# Patient Record
Sex: MALE | Race: WHITE | NOT HISPANIC OR LATINO | Employment: OTHER | URBAN - METROPOLITAN AREA
[De-identification: names, ages, dates, MRNs, and addresses within clinical notes are randomized per-mention and may not be internally consistent; named-entity substitution may affect disease eponyms.]

---

## 2017-11-21 ENCOUNTER — INITIAL CONSULT (OUTPATIENT)
Dept: VASCULAR SURGERY | Facility: CLINIC | Age: 57
End: 2017-11-21
Payer: MEDICARE

## 2017-11-21 VITALS
TEMPERATURE: 98 F | SYSTOLIC BLOOD PRESSURE: 123 MMHG | WEIGHT: 234 LBS | BODY MASS INDEX: 30.03 KG/M2 | DIASTOLIC BLOOD PRESSURE: 77 MMHG | HEART RATE: 88 BPM | HEIGHT: 74 IN

## 2017-11-21 DIAGNOSIS — F17.210 CIGARETTE NICOTINE DEPENDENCE WITHOUT COMPLICATION: ICD-10-CM

## 2017-11-21 DIAGNOSIS — I25.10 CORONARY ARTERY DISEASE INVOLVING NATIVE CORONARY ARTERY OF NATIVE HEART WITHOUT ANGINA PECTORIS: ICD-10-CM

## 2017-11-21 DIAGNOSIS — J44.9 CHRONIC OBSTRUCTIVE PULMONARY DISEASE, UNSPECIFIED COPD TYPE: ICD-10-CM

## 2017-11-21 DIAGNOSIS — I71.40 ABDOMINAL AORTIC ANEURYSM (AAA) WITHOUT RUPTURE: ICD-10-CM

## 2017-11-21 PROCEDURE — 99999 PR PBB SHADOW E&M-EST. PATIENT-LVL III: CPT | Mod: PBBFAC,,, | Performed by: SURGERY

## 2017-11-21 PROCEDURE — 99205 OFFICE O/P NEW HI 60 MIN: CPT | Mod: S$PBB,,, | Performed by: SURGERY

## 2017-11-21 PROCEDURE — 99213 OFFICE O/P EST LOW 20 MIN: CPT | Mod: PBBFAC | Performed by: SURGERY

## 2017-11-21 RX ORDER — ATORVASTATIN CALCIUM 40 MG/1
40 TABLET, FILM COATED ORAL DAILY
Qty: 90 TABLET | Refills: 3 | Status: SHIPPED | OUTPATIENT
Start: 2017-11-21 | End: 2018-11-21

## 2017-11-21 RX ORDER — NAPROXEN SODIUM 220 MG/1
81 TABLET, FILM COATED ORAL DAILY
Refills: 0 | COMMUNITY
Start: 2017-11-21 | End: 2018-11-21

## 2017-11-21 NOTE — LETTER
November 21, 2017      Arash Dasilva MD  [address]  [place] [address]           [facility]  [address]  [place] [address]  Phone: [phone]  Fax: [phone]          Patient: Georgi Jo   MR Number: [id number]   YOB: 1960   Date of Visit: 11/21/2017       Dear Dr. Arash Dasilva:    Thank you for referring Georgi Jo to me for evaluation. Attached you will find relevant portions of my assessment and plan of care.    If you have questions, please do not hesitate to call me. I look forward to following Georgi Jo along with you.    Sincerely,    ADAM Mendez III, MD    Enclosure  CC:  No Recipients    If you would like to receive this communication electronically, please contact [email or web address] or [phone] to request more information on [employer] access.    For providers and/or their staff who would like to refer a patient to [facility], please contact us through our one-stop-shop provider referral line, [facility], at [phone].    If you feel you have received this communication in error or would no longer like to receive these types of communications, please e-mail [email or web address]

## 2017-11-21 NOTE — PROGRESS NOTES
REFERRING PHYSICIAN:  Arash Yanes M.D.    HISTORY OF PRESENT ILLNESS:  A 57-year-old male with a recently discovered 5.5   cm juxtarenal abdominal aortic aneurysm, who was sent for potential evaluation   of a fenestrated endograft.  He has no acute back or abdominal pain.    PAST MEDICAL HISTORY:  1.  Known coronary artery disease, status post PCI with three stents in 2014.    Denies chest or abdominal pain.  2.  Active smoking.  3.  Hyperlipidemia.  4.  Prediabetes.    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    FAMILY HISTORY:  Positive for coronary artery disease.    SOCIAL HISTORY:  Current smoking, half pack per day.    MEDICATIONS:  Include aspirin and Plavix, although these were recently stopped.    He was also on statin.    ALLERGIES:  TO CODEINE, WHICH CAUSES ITCHING.    REVIEW OF SYSTEMS:  Admits to one block claudication.  Chronic blurred vision as   above.  All other systems include eyes, ENT, , respiratory, musculoskeletal,   breast, psychiatric, lymph, allergy, and immune are negative.    PHYSICAL EXAMINATION:  VITAL SIGNS:  See nursing notes.  GENERAL:  In no acute distress.  RESPIRATORY:  Normal effort.  Clear to auscultation.  CARDIAC:  Regular rate and rhythm.  PMI nondisplaced.  No murmur.  VASCULAR:  2+ radial and brachial pulses, femoral pulses are 1+, which may be   due to body habitus.  Pedal pulses are 2+ bilaterally.  EXTREMITIES:  Have some modest edema.  No varicosities, ulcerations, gangrene,   or digital petechiae.  Without clubbing or cyanosis.  ABDOMEN:  No masses or tenderness.  No hepatosplenomegaly.  His aortic aneurysm   is not readily palpable, which may be due to his body habitus.  EYES:  Normal conjunctivae and lids.  ENT:  Poor dentition.  NECK:  No JVD.  No thyromegaly.  MUSCULOSKELETAL:  No kyphosis or scoliosis.  SKIN:  Warm and dry.  NEUROLOGIC:  Alert and oriented x3.  Normal mood and affect.  Midline tongue.    No speech  difficulty or hoarseness, with 5/5 motor strength in all extremities.    IMAGING:  Outside CT scan was personally reviewed.  It demonstrates,  1.  Juxtarenal 5.5 cm AAA.  2.  Right renal artery much higher than left.  3.  Less than 5 mm between the right renal artery and the SMA.  4.  Small right common iliac artery aneurysm.  5.  Significant size LORENZO but SMA is widely patent without stenosis.    ASSESSMENT:  A 5.5 cm juxtarenal abdominal aortic aneurysm.    Unfortunately, he is not an anatomic candidate for fenestrated device because of   the close proximity of the renal artery to his SMA.  For the remaining   purposes, there is not enough room to put both a fenestration and a scallop.    RECOMMENDATIONS:  1.  I feel that he would be best served with an open operation, which would   require clamping above his left renal artery but likely below the right renal   artery.  2.  I agree with Dr. Lee's note that he needs a cardiac stress test prior to   consideration of an open repair.  I asked Dr. Lee's office to arrange this   for him to be done locally in [place].  3.  I told the patient and family that Dr. Lee might very well like to   perform this open operation locally, but they stated that was not the case, and   that he said he would need to come to [place] for either a fenestrated   repair or an open repair.  I will confirm with Dr. Lee that indeed he does   not wish to perform the open procedure.  If not, after the patient's stress test   returns like a copy of that and if this looks okay, then we can schedule him   for an open juxtarenal AAA repair.      [place]  dd: 11/21/2017 17:03:56 (CST)  td: 11/21/2017 17:50:18 (CST)  Doc ID   #[id number]  Job ID #[id number]    CC: VAL LEE M.D.

## 2017-12-12 ENCOUNTER — TELEPHONE (OUTPATIENT)
Dept: VASCULAR SURGERY | Facility: CLINIC | Age: 57
End: 2017-12-12

## 2017-12-12 NOTE — TELEPHONE ENCOUNTER
----- Message from Luca Cunningham sent at 12/12/2017 10:28 AM CST -----  Contact: Edna//Daughter  Caller states that (s)he needs to speak with nurse in ref to scheduling sx for the pt//please call back at [phone]//thank you

## 2017-12-19 ENCOUNTER — OFFICE VISIT (OUTPATIENT)
Dept: VASCULAR SURGERY | Facility: CLINIC | Age: 57
End: 2017-12-19
Payer: MEDICARE

## 2017-12-19 ENCOUNTER — HOSPITAL ENCOUNTER (OUTPATIENT)
Dept: RADIOLOGY | Facility: HOSPITAL | Age: 57
Discharge: HOME | End: 2017-12-19
Attending: SURGERY
Payer: MEDICARE

## 2017-12-19 VITALS
HEART RATE: 83 BPM | DIASTOLIC BLOOD PRESSURE: 78 MMHG | TEMPERATURE: 98 F | HEIGHT: 72 IN | SYSTOLIC BLOOD PRESSURE: 126 MMHG | WEIGHT: 241 LBS | BODY MASS INDEX: 32.64 KG/M2

## 2017-12-19 DIAGNOSIS — Z01.818 PRE-OP EVALUATION: ICD-10-CM

## 2017-12-19 DIAGNOSIS — I71.40 ABDOMINAL AORTIC ANEURYSM (AAA) WITHOUT RUPTURE: Primary | ICD-10-CM

## 2017-12-19 DIAGNOSIS — Z01.818 PRE-OP EVALUATION: Primary | ICD-10-CM

## 2017-12-19 DIAGNOSIS — I71.40 AAA (ABDOMINAL AORTIC ANEURYSM): ICD-10-CM

## 2017-12-19 DIAGNOSIS — I51.9 SYSTOLIC DYSFUNCTION: ICD-10-CM

## 2017-12-19 PROCEDURE — 99999 PR PBB SHADOW E&M-EST. PATIENT-LVL III: CPT | Mod: PBBFAC,,, | Performed by: SURGERY

## 2017-12-19 PROCEDURE — 71020 XR CHEST PA AND LATERAL: CPT | Mod: 26,,, | Performed by: RADIOLOGY

## 2017-12-19 PROCEDURE — 99213 OFFICE O/P EST LOW 20 MIN: CPT | Mod: PBBFAC | Performed by: SURGERY

## 2017-12-19 PROCEDURE — 71020 XR CHEST PA AND LATERAL: CPT | Mod: TC

## 2017-12-19 PROCEDURE — 99215 OFFICE O/P EST HI 40 MIN: CPT | Mod: S$PBB,,, | Performed by: SURGERY

## 2017-12-19 RX ORDER — LIDOCAINE HYDROCHLORIDE 10 MG/ML
1 INJECTION, SOLUTION EPIDURAL; INFILTRATION; INTRACAUDAL; PERINEURAL ONCE
Status: CANCELLED | OUTPATIENT
Start: 2017-12-19 | End: 2017-12-19

## 2017-12-19 RX ORDER — HYDROCODONE BITARTRATE AND ACETAMINOPHEN 500; 5 MG/1; MG/1
TABLET ORAL
Status: CANCELLED | OUTPATIENT
Start: 2017-12-19

## 2017-12-19 RX ORDER — MUPIROCIN 20 MG/G
OINTMENT TOPICAL
Status: CANCELLED | OUTPATIENT
Start: 2017-12-19

## 2017-12-19 NOTE — PROGRESS NOTES
REFERRING PHYSICIAN:  Arash Yanes M.D.    HISTORY OF PRESENT ILLNESS:  A 57-year-old male with a recently discovered 5.5   cm juxtarenal abdominal aortic aneurysm, who was sent for potential evaluation   of a fenestrated endograft.  He has no acute back or abdominal pain.    He now returns after cardiac eval in [place] which included a LHC 12/12/17 showing EF 45%, non-obstructive coronary Dz.    PAST MEDICAL HISTORY:  1.  Known coronary artery disease, status post PCI with three stents in 2014.    Denies chest or abdominal pain.  Recent LHC as above  2.  Active smoking.  3.  Hyperlipidemia.  4.  Prediabetes.    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    FAMILY HISTORY:  Positive for coronary artery disease.    SOCIAL HISTORY:  Current smoking, half pack per day.    MEDICATIONS:  Include aspirin     He was also on statin.    ALLERGIES:  TO CODEINE, WHICH CAUSES ITCHING.    REVIEW OF SYSTEMS:  Admits to one block claudication.  Chronic blurred vision as   above.  All other systems include eyes, ENT, , respiratory, musculoskeletal,   breast, psychiatric, lymph, allergy, and immune are negative.    PHYSICAL EXAMINATION:  VITAL SIGNS:  See nursing notes.  GENERAL:  In no acute distress.  RESPIRATORY:  Normal effort.  Clear to auscultation.  CARDIAC:  Regular rate and rhythm.  PMI nondisplaced.  No murmur.  VASCULAR:  2+ radial and brachial pulses, femoral pulses are 1+, which may be   due to body habitus.  Pedal pulses are 2+ bilaterally.  EXTREMITIES:  Have some modest edema.  No varicosities, ulcerations, gangrene,   or digital petechiae.  Without clubbing or cyanosis.  ABDOMEN:  No masses or tenderness.  No hepatosplenomegaly.  His aortic aneurysm   is not readily palpable, which may be due to his body habitus.  EYES:  Normal conjunctivae and lids.  ENT:  Poor dentition.  NECK:  No JVD.  No thyromegaly.  MUSCULOSKELETAL:  No kyphosis or scoliosis.  SKIN:  Warm and  dry.  NEUROLOGIC:  Alert and oriented x3.  Normal mood and affect.  Midline tongue.    No speech difficulty or hoarseness, with 5/5 motor strength in all extremities.    IMAGING:  Outside CT scan was personally reviewed.  It demonstrates,  1.  Juxtarenal 5.5 cm AAA.  2.  Right renal artery much higher than left.  3.  Less than 5 mm between the right renal artery and the SMA.  4.  Small right common iliac artery aneurysm.  5.  Significant size LORENZO but SMA is widely patent without stenosis.    ASSESSMENT:      1. A 5.5 cm juxtarenal abdominal aortic aneurysm.  2. Systolic dysfunction with EF 45%.    Non-obstructive CAD by recent [facility]    Unfortunately, he is not an anatomic candidate for fenestrated device because of   the close proximity of the renal artery to his SMA.  For the remaining   purposes, there is not enough room to put both a fenestration and a scallop.    RECOMMENDATIONS:  1.    Open Juxtarenal AAA repair 1/8/18.  2.  Clamp above L Renal, likely below R renal  3. Clr liquids there day before an Mg Citrate bowel prep  4. Cardiac anesthesia    I have explained the risks, benefits and alternatives for this procedure in detail.  The patient and family voices understanding and all questions have be answered, and agrees to proceed with the procedure.    CARMELITA Mendez III, MD, FACS  Professor and Chief, Vascular and Endovascular Surgery      CC: VAL LEE M.D.

## 2018-01-05 ENCOUNTER — ANESTHESIA EVENT (OUTPATIENT)
Dept: SURGERY | Facility: HOSPITAL | Age: 58
DRG: 269 | End: 2018-01-05
Payer: MEDICARE

## 2018-01-05 ENCOUNTER — TELEPHONE (OUTPATIENT)
Dept: VASCULAR SURGERY | Facility: CLINIC | Age: 58
End: 2018-01-05

## 2018-01-05 NOTE — ANESTHESIA PREPROCEDURE EVALUATION
Pre-operative evaluation for Procedure(s) (LRB):  Open Repair-Aneurysm-Abdominal-Aortic (Aaa) (N/A)    Georgi Jo is a 57 y.o. male with a recently discovered 5.5   cm juxtarenal abdominal aortic aneurysm who presents for above procedure. PMhx otherwise significant for active tobacco use, Pre-DM, CHF (EF 45%), CAD s/p PCI x 3 in 2014. No hx of anesthesia complications. He had a cardiac evaluation in [place], [place] which included a [facility] 12/12/17 showing EF 45% and non-obstructive coronary disease.     Prev airway: none on record     Patient Active Problem List   Diagnosis    Abdominal aortic aneurysm (AAA) without rupture    Coronary artery disease involving native coronary artery    Cigarette nicotine dependence without complication    COPD (chronic obstructive pulmonary disease)    Systolic dysfunction       Review of patient's allergies indicates:   Allergen Reactions    Codeine         No current facility-administered medications on file prior to encounter.      Current Outpatient Prescriptions on File Prior to Encounter   Medication Sig Dispense Refill    aspirin 81 MG Chew Take 1 tablet (81 mg total) by mouth once daily.  0    atorvastatin (LIPITOR) 40 MG tablet Take 1 tablet (40 mg total) by mouth once daily. 90 tablet 3       No past surgical history on file.    Social History     Social History    Marital status: Legally      Spouse name: N/A    Number of children: N/A    Years of education: N/A     Occupational History    Not on file.     Social History Main Topics    Smoking status: Not on file    Smokeless tobacco: Not on file    Alcohol use Not on file    Drug use: Unknown    Sexual activity: Not on file     Other Topics Concern    Not on file     Social History Narrative    No narrative on file         Vital Signs Range (Last 24H):         CBC: No results for input(s): WBC, RBC, HGB, HCT, PLT, MCV, MCH, MCHC in the last 72  hours.    CMP: No results for input(s): NA, K, CL, CO2, BUN, CREATININE, GLU, MG, PHOS, CALCIUM, ALBUMIN, PROT, ALKPHOS, ALT, AST, BILITOT in the last 72 hours.    INR  No results for input(s): PT, INR, PROTIME, APTT in the last 72 hours.        Diagnostic Studies:    Per vascular surgery notes:  Outside CT scan demonstrates,  1.  Juxtarenal 5.5 cm AAA.  2.  Right renal artery much higher than left.  3.  Less than 5 mm between the right renal artery and the SMA.  4.  Small right common iliac artery aneurysm.  5.  Significant size LORENZO but SMA is widely patent without stenosis.    EKG:  None on record, ordered for day of surgery     2D Echo:  None on record     LH performed 12/12/17 in [place]     Anesthesia Evaluation    I have reviewed the Patient Summary Reports.     I have reviewed the Medications.     Review of Systems  Anesthesia Hx:  No problems with previous Anesthesia Denies Hx of Anesthetic complications  History of prior surgery of interest to airway management or planning:  Denies Personal Hx of Anesthesia complications.   Hematology/Oncology:  Hematology Normal   Oncology Normal     EENT/Dental:EENT/Dental Normal   Cardiovascular:   Denies Pacemaker. CAD   CABG/stent  CHF PVD    Pulmonary:   COPD    Renal/:  Renal/ Normal     Hepatic/GI:  Hepatic/GI Normal    Musculoskeletal:  Musculoskeletal Normal    Neurological:  Neurology Normal    Endocrine:  Endocrine Normal    Dermatological:  Skin Normal    Psych:  Psychiatric Normal           Physical Exam  General:  Well nourished    Airway/Jaw/Neck:  Airway Findings: Mouth Opening: Small, but > 3cm Tongue: Normal  General Airway Assessment: Adult  Mallampati: III  TM Distance: Normal, at least 6 cm  Jaw/Neck Findings:  Neck ROM: Normal ROM     Eyes/Ears/Nose:  EYES/EARS/NOSE FINDINGS: Normal   Dental:  Dental Findings: Periodontal disease, Severe         Mental Status:  Mental Status Findings:  Cooperative, Alert and Oriented         Anesthesia Plan  Type  of Anesthesia, risks & benefits discussed:  Anesthesia Type:  general  Patient's Preference:   Intra-op Monitoring Plan: standard ASA monitors, central line and arterial line  Intra-op Monitoring Plan Comments:   Post Op Pain Control Plan: multimodal analgesia, IV/PO Opioids PRN and per primary service following discharge from PACU  Post Op Pain Control Plan Comments:   Induction:   IV  Beta Blocker:  Patient is not currently on a Beta-Blocker (No further documentation required).       Informed Consent: Patient understands risks and agrees with Anesthesia plan.  Questions answered. Anesthesia consent signed with patient.  ASA Score: 3     Day of Surgery Review of History & Physical:    H&P update referred to the surgeon.         Ready For Surgery From Anesthesia Perspective.

## 2018-01-05 NOTE — PRE-PROCEDURE INSTRUCTIONS
PreOp Instructions given:     - Verbal medication information (what to hold and what to take)   - NPO guidelines   - Arrival place directions given;  - Bathing with antibacterial soap   - Don't wear any jewelry or bring any valuables AM of surgery   - No makeup or moisturizer to face   - No perfume/cologne, powder, lotions or aftershave     Pt.'S WIFE verbalized understanding.     Denies any family history of side effects or issues with anesthesia or sedation.

## 2018-01-08 ENCOUNTER — SURGERY (OUTPATIENT)
Age: 58
End: 2018-01-08

## 2018-01-08 ENCOUNTER — ANESTHESIA (OUTPATIENT)
Dept: SURGERY | Facility: HOSPITAL | Age: 58
DRG: 269 | End: 2018-01-08
Payer: MEDICARE

## 2018-01-08 ENCOUNTER — HOSPITAL ENCOUNTER (INPATIENT)
Facility: HOSPITAL | Age: 58
LOS: 5 days | Discharge: HOME HEALTH | DRG: 269 | End: 2018-01-13
Attending: SURGERY | Admitting: SURGERY
Payer: MEDICARE

## 2018-01-08 DIAGNOSIS — I25.10 CORONARY ARTERY DISEASE INVOLVING NATIVE CORONARY ARTERY OF NATIVE HEART WITHOUT ANGINA PECTORIS: ICD-10-CM

## 2018-01-08 DIAGNOSIS — I51.9 SYSTOLIC DYSFUNCTION: ICD-10-CM

## 2018-01-08 DIAGNOSIS — J42 CHRONIC BRONCHITIS, UNSPECIFIED CHRONIC BRONCHITIS TYPE: ICD-10-CM

## 2018-01-08 DIAGNOSIS — I71.40 AAA (ABDOMINAL AORTIC ANEURYSM): Primary | ICD-10-CM

## 2018-01-08 DIAGNOSIS — I71.40 ABDOMINAL AORTIC ANEURYSM (AAA) WITHOUT RUPTURE: ICD-10-CM

## 2018-01-08 LAB
ABO + RH BLD: NORMAL
ALBUMIN SERPL BCP-MCNC: 2.7 G/DL
ALBUMIN SERPL BCP-MCNC: 2.8 G/DL
ALLENS TEST: ABNORMAL
ALLENS TEST: ABNORMAL
ALP SERPL-CCNC: 41 U/L
ALP SERPL-CCNC: 42 U/L
ALT SERPL W/O P-5'-P-CCNC: 19 U/L
ALT SERPL W/O P-5'-P-CCNC: 21 U/L
ANION GAP SERPL CALC-SCNC: 11 MMOL/L
ANION GAP SERPL CALC-SCNC: 6 MMOL/L
APTT BLDCRRT: 21.9 SEC
AST SERPL-CCNC: 26 U/L
AST SERPL-CCNC: 26 U/L
BASOPHILS # BLD AUTO: 0.04 K/UL
BASOPHILS # BLD AUTO: 0.08 K/UL
BASOPHILS NFR BLD: 0.4 %
BASOPHILS NFR BLD: 0.4 %
BILIRUB SERPL-MCNC: 1.5 MG/DL
BILIRUB SERPL-MCNC: 1.6 MG/DL
BLD GP AB SCN CELLS X3 SERPL QL: NORMAL
BUN SERPL-MCNC: 18 MG/DL
BUN SERPL-MCNC: 20 MG/DL
CALCIUM SERPL-MCNC: 7.7 MG/DL
CALCIUM SERPL-MCNC: 7.9 MG/DL
CHLORIDE SERPL-SCNC: 105 MMOL/L
CHLORIDE SERPL-SCNC: 108 MMOL/L
CO2 SERPL-SCNC: 24 MMOL/L
CO2 SERPL-SCNC: 25 MMOL/L
CREAT SERPL-MCNC: 1.2 MG/DL
CREAT SERPL-MCNC: 1.6 MG/DL
DELSYS: ABNORMAL
DELSYS: ABNORMAL
DIFFERENTIAL METHOD: ABNORMAL
DIFFERENTIAL METHOD: ABNORMAL
EOSINOPHIL # BLD AUTO: 0 K/UL
EOSINOPHIL # BLD AUTO: 0.1 K/UL
EOSINOPHIL NFR BLD: 0.1 %
EOSINOPHIL NFR BLD: 0.4 %
ERYTHROCYTE [DISTWIDTH] IN BLOOD BY AUTOMATED COUNT: 14 %
ERYTHROCYTE [DISTWIDTH] IN BLOOD BY AUTOMATED COUNT: 14.2 %
EST. GFR  (AFRICAN AMERICAN): 54.1 ML/MIN/1.73 M^2
EST. GFR  (AFRICAN AMERICAN): >60 ML/MIN/1.73 M^2
EST. GFR  (NON AFRICAN AMERICAN): 46.8 ML/MIN/1.73 M^2
EST. GFR  (NON AFRICAN AMERICAN): >60 ML/MIN/1.73 M^2
FLOW: 4
FLOW: 8
GLUCOSE SERPL-MCNC: 118 MG/DL
GLUCOSE SERPL-MCNC: 132 MG/DL (ref 70–110)
GLUCOSE SERPL-MCNC: 138 MG/DL
GLUCOSE SERPL-MCNC: 150 MG/DL (ref 70–110)
GLUCOSE SERPL-MCNC: 154 MG/DL (ref 70–110)
GLUCOSE SERPL-MCNC: 155 MG/DL (ref 70–110)
GLUCOSE SERPL-MCNC: 157 MG/DL (ref 70–110)
GLUCOSE SERPL-MCNC: 175 MG/DL (ref 70–110)
HCO3 UR-SCNC: 24.6 MMOL/L (ref 24–28)
HCO3 UR-SCNC: 24.8 MMOL/L (ref 24–28)
HCO3 UR-SCNC: 25.5 MMOL/L (ref 24–28)
HCO3 UR-SCNC: 25.7 MMOL/L (ref 24–28)
HCO3 UR-SCNC: 25.8 MMOL/L (ref 24–28)
HCO3 UR-SCNC: 27.4 MMOL/L (ref 24–28)
HCO3 UR-SCNC: 28.3 MMOL/L (ref 24–28)
HCO3 UR-SCNC: 29.8 MMOL/L (ref 24–28)
HCT VFR BLD AUTO: 39.9 %
HCT VFR BLD AUTO: 41.8 %
HCT VFR BLD CALC: 34 %PCV (ref 36–54)
HCT VFR BLD CALC: 36 %PCV (ref 36–54)
HCT VFR BLD CALC: 37 %PCV (ref 36–54)
HCT VFR BLD CALC: 38 %PCV (ref 36–54)
HCT VFR BLD CALC: 41 %PCV (ref 36–54)
HGB BLD-MCNC: 13.3 G/DL
HGB BLD-MCNC: 13.8 G/DL
IMM GRANULOCYTES # BLD AUTO: 0.04 K/UL
IMM GRANULOCYTES # BLD AUTO: 0.1 K/UL
IMM GRANULOCYTES NFR BLD AUTO: 0.4 %
IMM GRANULOCYTES NFR BLD AUTO: 0.6 %
INR PPP: 1.2
LACTATE SERPL-SCNC: 0.9 MMOL/L
LACTATE SERPL-SCNC: 2.3 MMOL/L
LYMPHOCYTES # BLD AUTO: 1.7 K/UL
LYMPHOCYTES # BLD AUTO: 2.2 K/UL
LYMPHOCYTES NFR BLD: 19.1 %
LYMPHOCYTES NFR BLD: 9.5 %
MCH RBC QN AUTO: 29.7 PG
MCH RBC QN AUTO: 29.9 PG
MCHC RBC AUTO-ENTMCNC: 33 G/DL
MCHC RBC AUTO-ENTMCNC: 33.3 G/DL
MCV RBC AUTO: 90 FL
MCV RBC AUTO: 90 FL
MODE: ABNORMAL
MODE: ABNORMAL
MONOCYTES # BLD AUTO: 1.2 K/UL
MONOCYTES # BLD AUTO: 1.5 K/UL
MONOCYTES NFR BLD: 10.6 %
MONOCYTES NFR BLD: 8.4 %
NEUTROPHILS # BLD AUTO: 14.4 K/UL
NEUTROPHILS # BLD AUTO: 7.8 K/UL
NEUTROPHILS NFR BLD: 69.4 %
NEUTROPHILS NFR BLD: 80.7 %
NRBC BLD-RTO: 0 /100 WBC
NRBC BLD-RTO: 0 /100 WBC
PCO2 BLDA: 41.9 MMHG (ref 35–45)
PCO2 BLDA: 42.3 MMHG (ref 35–45)
PCO2 BLDA: 45.1 MMHG (ref 35–45)
PCO2 BLDA: 45.2 MMHG (ref 35–45)
PCO2 BLDA: 45.4 MMHG (ref 35–45)
PCO2 BLDA: 51.2 MMHG (ref 35–45)
PCO2 BLDA: 60.8 MMHG (ref 35–45)
PCO2 BLDA: 61.7 MMHG (ref 35–45)
PH SMN: 7.28 [PH] (ref 7.35–7.45)
PH SMN: 7.29 [PH] (ref 7.35–7.45)
PH SMN: 7.31 [PH] (ref 7.35–7.45)
PH SMN: 7.34 [PH] (ref 7.35–7.45)
PH SMN: 7.36 [PH] (ref 7.35–7.45)
PH SMN: 7.38 [PH] (ref 7.35–7.45)
PH SMN: 7.39 [PH] (ref 7.35–7.45)
PH SMN: 7.39 [PH] (ref 7.35–7.45)
PLATELET # BLD AUTO: 174 K/UL
PLATELET # BLD AUTO: 184 K/UL
PMV BLD AUTO: 10 FL
PMV BLD AUTO: 9.7 FL
PO2 BLDA: 142 MMHG (ref 80–100)
PO2 BLDA: 172 MMHG (ref 80–100)
PO2 BLDA: 231 MMHG (ref 80–100)
PO2 BLDA: 232 MMHG (ref 80–100)
PO2 BLDA: 332 MMHG (ref 80–100)
PO2 BLDA: 79 MMHG (ref 80–100)
PO2 BLDA: 81 MMHG (ref 80–100)
PO2 BLDA: 82 MMHG (ref 80–100)
POC ACTIVATED CLOTTING TIME K: 186 SEC (ref 74–137)
POC ACTIVATED CLOTTING TIME K: 197 SEC (ref 74–137)
POC ACTIVATED CLOTTING TIME K: 202 SEC (ref 74–137)
POC ACTIVATED CLOTTING TIME K: 230 SEC (ref 74–137)
POC ACTIVATED CLOTTING TIME K: 76 SEC (ref 74–137)
POC ACTIVATED CLOTTING TIME K: 92 SEC (ref 74–137)
POC BE: -1 MMOL/L
POC BE: -1 MMOL/L
POC BE: 0 MMOL/L
POC BE: 1 MMOL/L
POC BE: 2 MMOL/L
POC BE: 3 MMOL/L
POC IONIZED CALCIUM: 0.97 MMOL/L (ref 1.06–1.42)
POC IONIZED CALCIUM: 1.01 MMOL/L (ref 1.06–1.42)
POC IONIZED CALCIUM: 1.03 MMOL/L (ref 1.06–1.42)
POC IONIZED CALCIUM: 1.1 MMOL/L (ref 1.06–1.42)
POC IONIZED CALCIUM: 1.12 MMOL/L (ref 1.06–1.42)
POC IONIZED CALCIUM: 1.17 MMOL/L (ref 1.06–1.42)
POC IONIZED CALCIUM: 1.3 MMOL/L (ref 1.06–1.42)
POC SATURATED O2: 100 % (ref 95–100)
POC SATURATED O2: 93 % (ref 95–100)
POC SATURATED O2: 95 % (ref 95–100)
POC SATURATED O2: 96 % (ref 95–100)
POC SATURATED O2: 99 % (ref 95–100)
POC SATURATED O2: 99 % (ref 95–100)
POC TCO2: 26 MMOL/L (ref 23–27)
POC TCO2: 26 MMOL/L (ref 23–27)
POC TCO2: 27 MMOL/L (ref 23–27)
POC TCO2: 29 MMOL/L (ref 23–27)
POC TCO2: 30 MMOL/L (ref 23–27)
POC TCO2: 32 MMOL/L (ref 23–27)
POCT GLUCOSE: 150 MG/DL (ref 70–110)
POTASSIUM BLD-SCNC: 3.8 MMOL/L (ref 3.5–5.1)
POTASSIUM BLD-SCNC: 4.1 MMOL/L (ref 3.5–5.1)
POTASSIUM BLD-SCNC: 4.1 MMOL/L (ref 3.5–5.1)
POTASSIUM BLD-SCNC: 4.3 MMOL/L (ref 3.5–5.1)
POTASSIUM BLD-SCNC: 4.5 MMOL/L (ref 3.5–5.1)
POTASSIUM BLD-SCNC: 4.5 MMOL/L (ref 3.5–5.1)
POTASSIUM BLD-SCNC: 4.6 MMOL/L (ref 3.5–5.1)
POTASSIUM SERPL-SCNC: 4.9 MMOL/L
POTASSIUM SERPL-SCNC: 5 MMOL/L
PROT SERPL-MCNC: 5.3 G/DL
PROT SERPL-MCNC: 5.5 G/DL
PROTHROMBIN TIME: 12.3 SEC
RBC # BLD AUTO: 4.45 M/UL
RBC # BLD AUTO: 4.65 M/UL
SAMPLE: ABNORMAL
SAMPLE: NORMAL
SAMPLE: NORMAL
SITE: ABNORMAL
SITE: ABNORMAL
SODIUM BLD-SCNC: 136 MMOL/L (ref 136–145)
SODIUM BLD-SCNC: 139 MMOL/L (ref 136–145)
SODIUM BLD-SCNC: 139 MMOL/L (ref 136–145)
SODIUM BLD-SCNC: 140 MMOL/L (ref 136–145)
SODIUM BLD-SCNC: 140 MMOL/L (ref 136–145)
SODIUM BLD-SCNC: 141 MMOL/L (ref 136–145)
SODIUM BLD-SCNC: 141 MMOL/L (ref 136–145)
SODIUM SERPL-SCNC: 139 MMOL/L
SODIUM SERPL-SCNC: 140 MMOL/L
SP02: 92
SP02: 95
WBC # BLD AUTO: 11.27 K/UL
WBC # BLD AUTO: 17.78 K/UL

## 2018-01-08 PROCEDURE — 85610 PROTHROMBIN TIME: CPT

## 2018-01-08 PROCEDURE — 85025 COMPLETE CBC W/AUTO DIFF WBC: CPT

## 2018-01-08 PROCEDURE — 25000003 PHARM REV CODE 250: Performed by: PAIN MEDICINE

## 2018-01-08 PROCEDURE — 04CD0ZZ EXTIRPATION OF MATTER FROM LEFT COMMON ILIAC ARTERY, OPEN APPROACH: ICD-10-PCS | Performed by: SURGERY

## 2018-01-08 PROCEDURE — C1768 GRAFT, VASCULAR: HCPCS | Performed by: SURGERY

## 2018-01-08 PROCEDURE — 36000711: Performed by: SURGERY

## 2018-01-08 PROCEDURE — 04CE0ZZ EXTIRPATION OF MATTER FROM RIGHT INTERNAL ILIAC ARTERY, OPEN APPROACH: ICD-10-PCS | Performed by: SURGERY

## 2018-01-08 PROCEDURE — 25000242 PHARM REV CODE 250 ALT 637 W/ HCPCS: Performed by: PAIN MEDICINE

## 2018-01-08 PROCEDURE — 80053 COMPREHEN METABOLIC PANEL: CPT | Mod: 91

## 2018-01-08 PROCEDURE — 99900035 HC TECH TIME PER 15 MIN (STAT)

## 2018-01-08 PROCEDURE — 37000009 HC ANESTHESIA EA ADD 15 MINS: Performed by: SURGERY

## 2018-01-08 PROCEDURE — 84132 ASSAY OF SERUM POTASSIUM: CPT

## 2018-01-08 PROCEDURE — 63600175 PHARM REV CODE 636 W HCPCS: Performed by: PAIN MEDICINE

## 2018-01-08 PROCEDURE — P9017 PLASMA 1 DONOR FRZ W/IN 8 HR: HCPCS

## 2018-01-08 PROCEDURE — 27100088 HC CELL SAVER

## 2018-01-08 PROCEDURE — 04R00JZ REPLACEMENT OF ABDOMINAL AORTA WITH SYNTHETIC SUBSTITUTE, OPEN APPROACH: ICD-10-PCS | Performed by: SURGERY

## 2018-01-08 PROCEDURE — 99499 UNLISTED E&M SERVICE: CPT | Mod: ,,, | Performed by: SURGERY

## 2018-01-08 PROCEDURE — 94640 AIRWAY INHALATION TREATMENT: CPT

## 2018-01-08 PROCEDURE — 25000003 PHARM REV CODE 250: Performed by: STUDENT IN AN ORGANIZED HEALTH CARE EDUCATION/TRAINING PROGRAM

## 2018-01-08 PROCEDURE — D9220A PRA ANESTHESIA: Mod: ,,, | Performed by: ANESTHESIOLOGY

## 2018-01-08 PROCEDURE — 63600175 PHARM REV CODE 636 W HCPCS: Performed by: SURGERY

## 2018-01-08 PROCEDURE — 25000003 PHARM REV CODE 250: Performed by: SURGERY

## 2018-01-08 PROCEDURE — S0028 INJECTION, FAMOTIDINE, 20 MG: HCPCS | Performed by: SURGERY

## 2018-01-08 PROCEDURE — C1757 CATH, THROMBECTOMY/EMBOLECT: HCPCS | Performed by: SURGERY

## 2018-01-08 PROCEDURE — 82803 BLOOD GASES ANY COMBINATION: CPT

## 2018-01-08 PROCEDURE — 86901 BLOOD TYPING SEROLOGIC RH(D): CPT

## 2018-01-08 PROCEDURE — 84295 ASSAY OF SERUM SODIUM: CPT

## 2018-01-08 PROCEDURE — 85014 HEMATOCRIT: CPT

## 2018-01-08 PROCEDURE — 94761 N-INVAS EAR/PLS OXIMETRY MLT: CPT

## 2018-01-08 PROCEDURE — P9021 RED BLOOD CELLS UNIT: HCPCS

## 2018-01-08 PROCEDURE — 86920 COMPATIBILITY TEST SPIN: CPT

## 2018-01-08 PROCEDURE — 37000008 HC ANESTHESIA 1ST 15 MINUTES: Performed by: SURGERY

## 2018-01-08 PROCEDURE — 35102 REPAIR DEFECT OF ARTERY: CPT | Mod: 22,GC,, | Performed by: SURGERY

## 2018-01-08 PROCEDURE — 37799 UNLISTED PX VASCULAR SURGERY: CPT

## 2018-01-08 PROCEDURE — 99223 1ST HOSP IP/OBS HIGH 75: CPT | Mod: ,,, | Performed by: SURGERY

## 2018-01-08 PROCEDURE — 04CH0ZZ EXTIRPATION OF MATTER FROM RIGHT EXTERNAL ILIAC ARTERY, OPEN APPROACH: ICD-10-PCS | Performed by: SURGERY

## 2018-01-08 PROCEDURE — 36620 INSERTION CATHETER ARTERY: CPT | Mod: 59,,, | Performed by: ANESTHESIOLOGY

## 2018-01-08 PROCEDURE — 27201041 HC RESERVOIR, CARDIOTOMY

## 2018-01-08 PROCEDURE — 82330 ASSAY OF CALCIUM: CPT

## 2018-01-08 PROCEDURE — 20000000 HC ICU ROOM

## 2018-01-08 PROCEDURE — 36556 INSERT NON-TUNNEL CV CATH: CPT | Mod: 59,,, | Performed by: ANESTHESIOLOGY

## 2018-01-08 PROCEDURE — 36000710: Performed by: SURGERY

## 2018-01-08 PROCEDURE — 25000242 PHARM REV CODE 250 ALT 637 W/ HCPCS: Performed by: SURGERY

## 2018-01-08 PROCEDURE — 27000221 HC OXYGEN, UP TO 24 HOURS

## 2018-01-08 PROCEDURE — 94799 UNLISTED PULMONARY SVC/PX: CPT

## 2018-01-08 PROCEDURE — 83605 ASSAY OF LACTIC ACID: CPT | Mod: 91

## 2018-01-08 PROCEDURE — 85730 THROMBOPLASTIN TIME PARTIAL: CPT

## 2018-01-08 PROCEDURE — 27201423 OPTIME MED/SURG SUP & DEVICES STERILE SUPPLY: Performed by: SURGERY

## 2018-01-08 PROCEDURE — 93005 ELECTROCARDIOGRAM TRACING: CPT

## 2018-01-08 DEVICE — IMPLANTABLE DEVICE: Type: IMPLANTABLE DEVICE | Site: AORTA | Status: FUNCTIONAL

## 2018-01-08 RX ORDER — ACETAMINOPHEN 10 MG/ML
1000 INJECTION, SOLUTION INTRAVENOUS EVERY 8 HOURS
Status: COMPLETED | OUTPATIENT
Start: 2018-01-08 | End: 2018-01-09

## 2018-01-08 RX ORDER — ROCURONIUM BROMIDE 10 MG/ML
INJECTION, SOLUTION INTRAVENOUS
Status: DISCONTINUED | OUTPATIENT
Start: 2018-01-08 | End: 2018-01-08

## 2018-01-08 RX ORDER — CEFAZOLIN SODIUM 1 G/3ML
2 INJECTION, POWDER, FOR SOLUTION INTRAMUSCULAR; INTRAVENOUS
Status: DISCONTINUED | OUTPATIENT
Start: 2018-01-08 | End: 2018-01-08 | Stop reason: HOSPADM

## 2018-01-08 RX ORDER — GLYCOPYRROLATE 0.2 MG/ML
INJECTION INTRAMUSCULAR; INTRAVENOUS
Status: DISCONTINUED | OUTPATIENT
Start: 2018-01-08 | End: 2018-01-08

## 2018-01-08 RX ORDER — LABETALOL HYDROCHLORIDE 5 MG/ML
10 INJECTION, SOLUTION INTRAVENOUS EVERY 4 HOURS PRN
Status: DISCONTINUED | OUTPATIENT
Start: 2018-01-08 | End: 2018-01-13 | Stop reason: HOSPADM

## 2018-01-08 RX ORDER — IBUPROFEN 200 MG
1 TABLET ORAL DAILY
Status: DISCONTINUED | OUTPATIENT
Start: 2018-01-09 | End: 2018-01-10

## 2018-01-08 RX ORDER — NICARDIPINE HYDROCHLORIDE 0.2 MG/ML
INJECTION INTRAVENOUS CONTINUOUS PRN
Status: DISCONTINUED | OUTPATIENT
Start: 2018-01-08 | End: 2018-01-08

## 2018-01-08 RX ORDER — CEFAZOLIN SODIUM 1 G/3ML
2 INJECTION, POWDER, FOR SOLUTION INTRAMUSCULAR; INTRAVENOUS
Status: COMPLETED | OUTPATIENT
Start: 2018-01-08 | End: 2018-01-08

## 2018-01-08 RX ORDER — ONDANSETRON 2 MG/ML
INJECTION INTRAMUSCULAR; INTRAVENOUS
Status: DISCONTINUED | OUTPATIENT
Start: 2018-01-08 | End: 2018-01-08

## 2018-01-08 RX ORDER — CEFAZOLIN SODIUM 1 G/3ML
2 INJECTION, POWDER, FOR SOLUTION INTRAMUSCULAR; INTRAVENOUS
Status: DISCONTINUED | OUTPATIENT
Start: 2018-01-08 | End: 2018-01-08

## 2018-01-08 RX ORDER — KETAMINE HYDROCHLORIDE 100 MG/ML
INJECTION, SOLUTION INTRAMUSCULAR; INTRAVENOUS
Status: DISCONTINUED | OUTPATIENT
Start: 2018-01-08 | End: 2018-01-08

## 2018-01-08 RX ORDER — HEPARIN SODIUM 5000 [USP'U]/ML
5000 INJECTION, SOLUTION INTRAVENOUS; SUBCUTANEOUS EVERY 12 HOURS
Status: DISCONTINUED | OUTPATIENT
Start: 2018-01-09 | End: 2018-01-13 | Stop reason: HOSPADM

## 2018-01-08 RX ORDER — ALBUTEROL SULFATE 90 UG/1
AEROSOL, METERED RESPIRATORY (INHALATION)
Status: DISCONTINUED | OUTPATIENT
Start: 2018-01-08 | End: 2018-01-08

## 2018-01-08 RX ORDER — FENTANYL CITRATE 50 UG/ML
INJECTION, SOLUTION INTRAMUSCULAR; INTRAVENOUS
Status: DISCONTINUED | OUTPATIENT
Start: 2018-01-08 | End: 2018-01-08

## 2018-01-08 RX ORDER — PROPOFOL 10 MG/ML
VIAL (ML) INTRAVENOUS
Status: DISCONTINUED | OUTPATIENT
Start: 2018-01-08 | End: 2018-01-08

## 2018-01-08 RX ORDER — NEOSTIGMINE METHYLSULFATE 1 MG/ML
INJECTION, SOLUTION INTRAVENOUS
Status: DISCONTINUED | OUTPATIENT
Start: 2018-01-08 | End: 2018-01-08

## 2018-01-08 RX ORDER — HEPARIN SODIUM 1000 [USP'U]/ML
INJECTION, SOLUTION INTRAVENOUS; SUBCUTANEOUS
Status: DISCONTINUED | OUTPATIENT
Start: 2018-01-08 | End: 2018-01-08 | Stop reason: HOSPADM

## 2018-01-08 RX ORDER — HEPARIN SODIUM 1000 [USP'U]/ML
INJECTION, SOLUTION INTRAVENOUS; SUBCUTANEOUS
Status: DISCONTINUED | OUTPATIENT
Start: 2018-01-08 | End: 2018-01-08

## 2018-01-08 RX ORDER — HYDROMORPHONE HYDROCHLORIDE 2 MG/ML
INJECTION, SOLUTION INTRAMUSCULAR; INTRAVENOUS; SUBCUTANEOUS
Status: DISCONTINUED | OUTPATIENT
Start: 2018-01-08 | End: 2018-01-08

## 2018-01-08 RX ORDER — FUROSEMIDE 10 MG/ML
INJECTION INTRAMUSCULAR; INTRAVENOUS
Status: DISCONTINUED | OUTPATIENT
Start: 2018-01-08 | End: 2018-01-08

## 2018-01-08 RX ORDER — LIDOCAINE HCL/PF 100 MG/5ML
SYRINGE (ML) INTRAVENOUS
Status: DISCONTINUED | OUTPATIENT
Start: 2018-01-08 | End: 2018-01-08

## 2018-01-08 RX ORDER — LIDOCAINE HYDROCHLORIDE 10 MG/ML
1 INJECTION, SOLUTION EPIDURAL; INFILTRATION; INTRACAUDAL; PERINEURAL ONCE
Status: COMPLETED | OUTPATIENT
Start: 2018-01-08 | End: 2018-01-08

## 2018-01-08 RX ORDER — HYDROCODONE BITARTRATE AND ACETAMINOPHEN 500; 5 MG/1; MG/1
TABLET ORAL
Status: DISCONTINUED | OUTPATIENT
Start: 2018-01-08 | End: 2018-01-08

## 2018-01-08 RX ORDER — MIDAZOLAM HYDROCHLORIDE 1 MG/ML
INJECTION, SOLUTION INTRAMUSCULAR; INTRAVENOUS
Status: DISCONTINUED | OUTPATIENT
Start: 2018-01-08 | End: 2018-01-08

## 2018-01-08 RX ORDER — SODIUM CHLORIDE, SODIUM LACTATE, POTASSIUM CHLORIDE, CALCIUM CHLORIDE 600; 310; 30; 20 MG/100ML; MG/100ML; MG/100ML; MG/100ML
INJECTION, SOLUTION INTRAVENOUS CONTINUOUS
Status: DISCONTINUED | OUTPATIENT
Start: 2018-01-08 | End: 2018-01-10

## 2018-01-08 RX ORDER — FAMOTIDINE 10 MG/ML
20 INJECTION INTRAVENOUS 2 TIMES DAILY
Status: DISCONTINUED | OUTPATIENT
Start: 2018-01-08 | End: 2018-01-10

## 2018-01-08 RX ORDER — SODIUM CHLORIDE 9 MG/ML
INJECTION, SOLUTION INTRAVENOUS CONTINUOUS PRN
Status: DISCONTINUED | OUTPATIENT
Start: 2018-01-08 | End: 2018-01-08

## 2018-01-08 RX ORDER — SODIUM BICARBONATE 42 MG/ML
INJECTION, SOLUTION INTRAVENOUS
Status: DISCONTINUED | OUTPATIENT
Start: 2018-01-08 | End: 2018-01-08

## 2018-01-08 RX ORDER — NALOXONE HCL 0.4 MG/ML
0.02 VIAL (ML) INJECTION
Status: DISCONTINUED | OUTPATIENT
Start: 2018-01-08 | End: 2018-01-10

## 2018-01-08 RX ORDER — IPRATROPIUM BROMIDE AND ALBUTEROL SULFATE 2.5; .5 MG/3ML; MG/3ML
3 SOLUTION RESPIRATORY (INHALATION)
Status: DISCONTINUED | OUTPATIENT
Start: 2018-01-08 | End: 2018-01-12

## 2018-01-08 RX ORDER — PROTAMINE SULFATE 10 MG/ML
INJECTION, SOLUTION INTRAVENOUS
Status: DISCONTINUED | OUTPATIENT
Start: 2018-01-08 | End: 2018-01-08

## 2018-01-08 RX ORDER — MUPIROCIN 20 MG/G
1 OINTMENT TOPICAL 2 TIMES DAILY
Status: DISCONTINUED | OUTPATIENT
Start: 2018-01-08 | End: 2018-01-13 | Stop reason: HOSPADM

## 2018-01-08 RX ORDER — ACETAMINOPHEN 10 MG/ML
INJECTION, SOLUTION INTRAVENOUS
Status: DISCONTINUED | OUTPATIENT
Start: 2018-01-08 | End: 2018-01-08

## 2018-01-08 RX ORDER — MUPIROCIN 20 MG/G
OINTMENT TOPICAL
Status: DISCONTINUED | OUTPATIENT
Start: 2018-01-08 | End: 2018-01-08 | Stop reason: HOSPADM

## 2018-01-08 RX ORDER — HYDROMORPHONE HCL IN 0.9% NACL 6 MG/30 ML
PATIENT CONTROLLED ANALGESIA SYRINGE INTRAVENOUS CONTINUOUS
Status: DISCONTINUED | OUTPATIENT
Start: 2018-01-08 | End: 2018-01-10

## 2018-01-08 RX ORDER — PHENYLEPHRINE HYDROCHLORIDE 10 MG/ML
INJECTION INTRAVENOUS
Status: DISCONTINUED | OUTPATIENT
Start: 2018-01-08 | End: 2018-01-08

## 2018-01-08 RX ORDER — MANNITOL 250 MG/ML
INJECTION, SOLUTION INTRAVENOUS
Status: DISCONTINUED | OUTPATIENT
Start: 2018-01-08 | End: 2018-01-08

## 2018-01-08 RX ADMIN — HEPARIN SODIUM 2000 UNITS: 1000 INJECTION, SOLUTION INTRAVENOUS; SUBCUTANEOUS at 11:01

## 2018-01-08 RX ADMIN — MIDAZOLAM HYDROCHLORIDE 2 MG: 1 INJECTION, SOLUTION INTRAMUSCULAR; INTRAVENOUS at 06:01

## 2018-01-08 RX ADMIN — SODIUM CHLORIDE, SODIUM GLUCONATE, SODIUM ACETATE, POTASSIUM CHLORIDE, MAGNESIUM CHLORIDE, SODIUM PHOSPHATE, DIBASIC, AND POTASSIUM PHOSPHATE: .53; .5; .37; .037; .03; .012; .00082 INJECTION, SOLUTION INTRAVENOUS at 11:01

## 2018-01-08 RX ADMIN — KETAMINE HYDROCHLORIDE 10 MG: 100 INJECTION, SOLUTION, CONCENTRATE INTRAMUSCULAR; INTRAVENOUS at 09:01

## 2018-01-08 RX ADMIN — ACETAMINOPHEN 1000 MG: 10 INJECTION, SOLUTION INTRAVENOUS at 01:01

## 2018-01-08 RX ADMIN — ACETAMINOPHEN 1000 MG: 10 INJECTION, SOLUTION INTRAVENOUS at 08:01

## 2018-01-08 RX ADMIN — ROCURONIUM BROMIDE 10 MG: 10 INJECTION, SOLUTION INTRAVENOUS at 11:01

## 2018-01-08 RX ADMIN — SODIUM CHLORIDE, SODIUM LACTATE, POTASSIUM CHLORIDE, AND CALCIUM CHLORIDE: 600; 310; 30; 20 INJECTION, SOLUTION INTRAVENOUS at 08:01

## 2018-01-08 RX ADMIN — HEPARIN SODIUM 10000 UNITS: 1000 INJECTION, SOLUTION INTRAVENOUS; SUBCUTANEOUS at 08:01

## 2018-01-08 RX ADMIN — GLYCOPYRROLATE 0.6 MG: 0.2 INJECTION, SOLUTION INTRAMUSCULAR; INTRAVENOUS at 12:01

## 2018-01-08 RX ADMIN — NEOSTIGMINE METHYLSULFATE 5 MG: 1 INJECTION INTRAVENOUS at 12:01

## 2018-01-08 RX ADMIN — LIDOCAINE HYDROCHLORIDE 100 MG: 20 INJECTION, SOLUTION INTRAVENOUS at 07:01

## 2018-01-08 RX ADMIN — SODIUM BICARBONATE 20 MEQ: 42 INJECTION, SOLUTION INTRAVENOUS at 11:01

## 2018-01-08 RX ADMIN — FENTANYL CITRATE 100 MCG: 50 INJECTION, SOLUTION INTRAMUSCULAR; INTRAVENOUS at 07:01

## 2018-01-08 RX ADMIN — MANNITOL 12.5 G: 250 INJECTION, SOLUTION INTRAVENOUS at 10:01

## 2018-01-08 RX ADMIN — PHENYLEPHRINE HYDROCHLORIDE 100 MCG: 10 INJECTION INTRAVENOUS at 11:01

## 2018-01-08 RX ADMIN — ROCURONIUM BROMIDE 10 MG: 10 INJECTION, SOLUTION INTRAVENOUS at 09:01

## 2018-01-08 RX ADMIN — FUROSEMIDE 10 MG: 10 INJECTION, SOLUTION INTRAMUSCULAR; INTRAVENOUS at 10:01

## 2018-01-08 RX ADMIN — DEXTROSE 2 G: 50 INJECTION, SOLUTION INTRAVENOUS at 07:01

## 2018-01-08 RX ADMIN — MUPIROCIN: 20 OINTMENT TOPICAL at 06:01

## 2018-01-08 RX ADMIN — ROCURONIUM BROMIDE 20 MG: 10 INJECTION, SOLUTION INTRAVENOUS at 12:01

## 2018-01-08 RX ADMIN — HYDROMORPHONE HYDROCHLORIDE 0.4 MG: 2 INJECTION INTRAMUSCULAR; INTRAVENOUS; SUBCUTANEOUS at 12:01

## 2018-01-08 RX ADMIN — SODIUM CHLORIDE, SODIUM GLUCONATE, SODIUM ACETATE, POTASSIUM CHLORIDE, MAGNESIUM CHLORIDE, SODIUM PHOSPHATE, DIBASIC, AND POTASSIUM PHOSPHATE: .53; .5; .37; .037; .03; .012; .00082 INJECTION, SOLUTION INTRAVENOUS at 07:01

## 2018-01-08 RX ADMIN — SODIUM CHLORIDE: 0.9 INJECTION, SOLUTION INTRAVENOUS at 08:01

## 2018-01-08 RX ADMIN — ROCURONIUM BROMIDE 20 MG: 10 INJECTION, SOLUTION INTRAVENOUS at 10:01

## 2018-01-08 RX ADMIN — DEXTROSE 2 G: 50 INJECTION, SOLUTION INTRAVENOUS at 11:01

## 2018-01-08 RX ADMIN — PROTAMINE SULFATE 100 MG: 10 INJECTION, SOLUTION INTRAVENOUS at 11:01

## 2018-01-08 RX ADMIN — PROPOFOL 150 MG: 10 INJECTION, EMULSION INTRAVENOUS at 07:01

## 2018-01-08 RX ADMIN — SODIUM CHLORIDE 1000 ML: 0.9 INJECTION, SOLUTION INTRAVENOUS at 04:01

## 2018-01-08 RX ADMIN — IPRATROPIUM BROMIDE AND ALBUTEROL SULFATE 3 ML: .5; 3 SOLUTION RESPIRATORY (INHALATION) at 08:01

## 2018-01-08 RX ADMIN — CALCIUM CHLORIDE 0.5 G: 100 INJECTION, SOLUTION INTRAVENOUS at 11:01

## 2018-01-08 RX ADMIN — SODIUM CHLORIDE, SODIUM GLUCONATE, SODIUM ACETATE, POTASSIUM CHLORIDE, MAGNESIUM CHLORIDE, SODIUM PHOSPHATE, DIBASIC, AND POTASSIUM PHOSPHATE: .53; .5; .37; .037; .03; .012; .00082 INJECTION, SOLUTION INTRAVENOUS at 10:01

## 2018-01-08 RX ADMIN — SODIUM CHLORIDE, SODIUM LACTATE, POTASSIUM CHLORIDE, AND CALCIUM CHLORIDE: 600; 310; 30; 20 INJECTION, SOLUTION INTRAVENOUS at 01:01

## 2018-01-08 RX ADMIN — SODIUM CHLORIDE: 0.9 INJECTION, SOLUTION INTRAVENOUS at 06:01

## 2018-01-08 RX ADMIN — ROCURONIUM BROMIDE 50 MG: 10 INJECTION, SOLUTION INTRAVENOUS at 07:01

## 2018-01-08 RX ADMIN — Medication: at 02:01

## 2018-01-08 RX ADMIN — CALCIUM CHLORIDE 0.25 G: 100 INJECTION, SOLUTION INTRAVENOUS at 11:01

## 2018-01-08 RX ADMIN — ALBUTEROL SULFATE 2 PUFF: 90 AEROSOL, METERED RESPIRATORY (INHALATION) at 08:01

## 2018-01-08 RX ADMIN — FAMOTIDINE 20 MG: 10 INJECTION, SOLUTION INTRAVENOUS at 08:01

## 2018-01-08 RX ADMIN — PHENYLEPHRINE HYDROCHLORIDE 200 MCG: 10 INJECTION INTRAVENOUS at 11:01

## 2018-01-08 RX ADMIN — LIDOCAINE HYDROCHLORIDE 1 MG: 10 INJECTION, SOLUTION EPIDURAL; INFILTRATION; INTRACAUDAL; PERINEURAL at 06:01

## 2018-01-08 RX ADMIN — HEPARIN SODIUM 5000 UNITS: 1000 INJECTION, SOLUTION INTRAVENOUS; SUBCUTANEOUS at 10:01

## 2018-01-08 RX ADMIN — HEPARIN SODIUM 10000 UNITS: 1000 INJECTION, SOLUTION INTRAVENOUS; SUBCUTANEOUS at 10:01

## 2018-01-08 RX ADMIN — PHENYLEPHRINE HYDROCHLORIDE 100 MCG: 10 INJECTION INTRAVENOUS at 12:01

## 2018-01-08 RX ADMIN — ONDANSETRON 4 MG: 2 INJECTION INTRAMUSCULAR; INTRAVENOUS at 12:01

## 2018-01-08 RX ADMIN — PHENYLEPHRINE HYDROCHLORIDE 100 MCG: 10 INJECTION INTRAVENOUS at 07:01

## 2018-01-08 RX ADMIN — KETAMINE HYDROCHLORIDE 30 MG: 100 INJECTION, SOLUTION, CONCENTRATE INTRAMUSCULAR; INTRAVENOUS at 07:01

## 2018-01-08 RX ADMIN — ROCURONIUM BROMIDE 20 MG: 10 INJECTION, SOLUTION INTRAVENOUS at 09:01

## 2018-01-08 RX ADMIN — NICARDIPINE HYDROCHLORIDE 5 MG/HR: 0.2 INJECTION, SOLUTION INTRAVENOUS at 10:01

## 2018-01-08 RX ADMIN — SODIUM CHLORIDE, SODIUM GLUCONATE, SODIUM ACETATE, POTASSIUM CHLORIDE, MAGNESIUM CHLORIDE, SODIUM PHOSPHATE, DIBASIC, AND POTASSIUM PHOSPHATE: .53; .5; .37; .037; .03; .012; .00082 INJECTION, SOLUTION INTRAVENOUS at 12:01

## 2018-01-08 RX ADMIN — CEFAZOLIN 2 G: 330 INJECTION, POWDER, FOR SOLUTION INTRAMUSCULAR; INTRAVENOUS at 08:01

## 2018-01-08 RX ADMIN — FENTANYL CITRATE 50 MCG: 50 INJECTION, SOLUTION INTRAMUSCULAR; INTRAVENOUS at 11:01

## 2018-01-08 RX ADMIN — MUPIROCIN 1 G: 20 OINTMENT TOPICAL at 08:01

## 2018-01-08 RX ADMIN — SODIUM BICARBONATE 30 MEQ: 42 INJECTION, SOLUTION INTRAVENOUS at 11:01

## 2018-01-08 RX ADMIN — KETAMINE HYDROCHLORIDE 10 MG: 100 INJECTION, SOLUTION, CONCENTRATE INTRAMUSCULAR; INTRAVENOUS at 11:01

## 2018-01-08 RX ADMIN — ACETAMINOPHEN 1000 MG: 10 INJECTION, SOLUTION INTRAVENOUS at 09:01

## 2018-01-08 RX ADMIN — FENTANYL CITRATE 25 MCG: 50 INJECTION, SOLUTION INTRAMUSCULAR; INTRAVENOUS at 10:01

## 2018-01-08 RX ADMIN — FENTANYL CITRATE 25 MCG: 50 INJECTION, SOLUTION INTRAMUSCULAR; INTRAVENOUS at 09:01

## 2018-01-08 RX ADMIN — PHENYLEPHRINE HYDROCHLORIDE 100 MCG: 10 INJECTION INTRAVENOUS at 08:01

## 2018-01-08 NOTE — ANESTHESIA PROCEDURE NOTES
Central Line    Diagnosis: AAA  Patient location during procedure: done in OR  Procedure start time: 1/8/2018 7:24 AM  Timeout: 1/8/2018 7:20 AM  Procedure end time: 1/8/2018 7:34 AM  Staffing  Anesthesiologist: NEVA CROW  Resident/CRNA: LUBNA SWIFT  Performed: resident/CRNA   Anesthesiologist was present at the time of the procedure.  Preanesthetic Checklist  Completed: patient identified, site marked, surgical consent, pre-op evaluation, timeout performed, IV checked, risks and benefits discussed, monitors and equipment checked and anesthesia consent given  Indication  Indication: hemodynamic monitoring, vascular access, med administration     Anesthesia   general anesthesia    Central Line  Skin Prep: skin prepped with ChloraPrep, skin prep agent completely dried prior to procedure  maximum sterile barriers used during central venous catheter insertion  hand hygiene performed prior to central venous catheter insertion  Location: right internal jugular,   Catheter type: triple lumen  Catheter Size: 12 Fr  Ultrasound: vascular probe with ultrasound  Vessel Caliber: medium, patent, compressibility normal  Needle advanced into vessel with real time Ultrasound guidance.  Guidewire confirmed in vessel.  Sterile sheath used.   Manometry: Venous cannualation confirmed by visual estimation of blood vessel pressure using manometry.  Insertion Attempts: 1   Securement:line sutured, chlorhexidine patch, sterile dressing applied and blood return through all ports     Post-Procedure  Adverse Events:none

## 2018-01-08 NOTE — OP NOTE
[facility]  Vascular Surgery  Operative Note    SUMMARY     Date of Procedure: 1/8/2018     Procedure: Open Para-renal AAA repair with 18 x 9 bifurcated dacron    Surgeon(s) and Role:     * ADAM Mendez III, MD - Primary     * Shelley Lundy MD - Fellow     * Anand Plummer MD - 1st assist     * Jaime Juarez MD - Resident - Assisting        Pre-Operative Diagnosis: Abdominal aortic aneurysm (AAA) without rupture, para-renal, 5.6 cm, and R iliac aneurysm    Post-Operative Diagnosis: same  Anesthesia: General    Indication for operation: 56 yo M with pararenal aneurysm 5.5 cm.    Description of the Findings of the Procedure: supra-renal clamp (both renals), 29 min ischemic time    Complications: No    Estimated Blood Loss (EBL): 1500 mL           Implants:   Implant Name Type Inv. Item Serial No.  Lot No. LRB No. Used   GRAFT HEMAGARD [id number] KNITTED - [id number]   GRAFT HEMAGARD [id number] KNITTED [id number] MAQUET [id number] N/A 1       Specimens:   Specimen (12h ago through future)    None           CODING NOTE: -22 Modifier appropriate given the increased complexity and length of this case, particularly the proximal aortic exposure           Condition: Good    Disposition: ICU - extubated and stable.     Operation in detail:  The patient brought in the Operating Room, placed in supine position.  After general anesthesia and placement of line, the patient was sterilely prepped and draped.  A midline abdominal incision was then performed.  The subcutaneous tissues and fascia were divided with electrocautery and the abdomen was carefully opened.  The colon was retracted superiorly and the small bowel to the right, exposing the retroperitoneum.  Patient was noted to have thickened mesentery and extensive retroperitoneal fat.  The retroperitoneum over the aorta was carefully incised. Lymphatics were ligated.  The distal aorta was exposed with identification of the LORENZO.   The right common iliac was identified and noted to be aneurysmal, the right external and internal iliac arteries were identified and skeletonized for clamping.  The left common iliac was dissected free to allow for clamping of the left common iliac artery.  Attention then turned to proximal dissection.  Dissection was made difficult secondary to extensive retroperitoneal fat and posterior angulation of the aorta.  The left renal vein was identified and skeletonized and then divided with a vascular YAMILE.  Further dissection of the anterior part of the aorta was then performed.  The left renal artery was identified as was the right renal artery and close proximity of the SMA.  There was enough room between the renal arteries and SMA to allow for clamping suprarenal but infraceliac.  Bilateral renal arteries were carefully dissected free to allow for suprarenal clamping.  The patient was systemically heparinized as well as mannitol given prior to clamping.     The right external and internal iliac arteries and left common iliac artery were clamped.  This was followed by suprarenal clamping of the aorta.  A longitudinal aortotomy was made with large amount of laminated thrombus removed.  Aortotomy was extended proximally to below the renal arteries to allow for proximal graft anastomosis.  Distally the aortotomy was extended onto the right common iliac artery to expose the internal and external orifices and onto the right common iliac artery.  Several large lumbar arteries were ligated with silk sutures. The LORENZO was noted to have pulsatile back bleeding this was also ligated with a silk suture ligature.  An end-to-end anastomosis was performed with a 18 x 9 bifurcated Dacron graft sewn in with a 3-0 Prolene suture.  The posterior wall was noted to be soft with atheroma that was removed prior to anastomosis.  Total ischemic clamp time was 29 minutes on initial release of anastomotic clamp.  There were palpable pulses in  right and left renal arteries. There was excellent hemostasis with no hemostatic sutures needed.     Attention first turned to right limb anastomosis.  The right limb was flushed forward with excess length of the right iliac limb excised and beveled for upcoming anastomosis.  Anastomosis was performed in end to end fashion to the bifurcation of the right iliac artery- to both the internal and external iliac orifices with care taken to preserve patency of both- with 4-0 prolene suture.  Prior to completion the internal and external iliac arteries were noted to have good back bleeding with strong forward bleeding from aortic graft.  Upon completion of anastomosis distal pulse noted in right external iliac artery with hemostasis of the suture line.      Attention then turned to left limb anastomosis.  The left limb was flushed forward with excess length of the left iliac limb excised and beveled for upcoming anastomosis.  An end to end anastomosis was performed with 4-0 prolene to the left common iliac.   Prior to completion the common iliac artery was noted to have good back bleeding with strong forward bleeding from aortic graft.  Upon completion of anastomosis distal pulse noted in left common iliac artery with hemostasis of the suture line.       Patient was then given IV protamine.  Posterior wall of aorta was re-examined with several lumbar arteries suture ligated with good hemostasis noted in the aneurysm sac.  Aneurysm sac was closed over graft with running 3-0 prolene.  The proximal aortic anastomosis was then inspected. Hemostasis was noted in the proximal anastomosis.  Retroperitoneal was closed over graft with running 3-0 Vicryl sutures so entirety of graft was covered.        The midline incision was closed with a running double #1 PDS and skin was closed with staples.  Sterile dressings were applied.  Patient was noted to have palpable DP bilaterally. He was extubated. The patient was taken to ICU in  stable condition.

## 2018-01-08 NOTE — BRIEF OP NOTE
[facility]  Brief Operative Note    SUMMARY     Surgery Date: 1/8/2018     Surgeon(s) and Role:     * ADAM Mendez III, MD - Primary     * Shelley Lundy MD - Fellow     * Anand Plummer MD -  1st assist     * Jaime Juaerz MD - Resident - Assisting        Pre-op Diagnosis:  Abdominal aortic aneurysm (AAA) without rupture, para-renal, 5.6 cm, and R iliac aneurysm    Post-op Diagnosis: same    PROCEDURES:   Open Para-renal AAA repair with 18 x 9 bifurcated dacron    Anesthesia: General    Description of Procedure: suptra-renal clamp (both renals), 29 min ischemic time    Description of the findings of the procedure: as above    Estimated Blood Loss: 1500 mL    .CODING NOTE: -22 Modifier appropriate given the increased complexity and length of this case, particularly the proximal exposure         Specimens:   Specimen (12h ago through future)    None

## 2018-01-08 NOTE — NURSING
Patient transported to SICU from OR via bed per anesthesia team. Patient connected to continuous cardiac monitoring and supplemental O2 via Simple Face Mask during transport. When patient arrived to room, patient connected to SICU monitor and supplemental wall O2. Full body assessment completed. VSS. Patient oriented to room.Bed locked and in lowest position. Call bell in reach. Will continue to monitor closely.

## 2018-01-08 NOTE — H&P (VIEW-ONLY)
REFERRING PHYSICIAN:  Arash Yanes M.D.    HISTORY OF PRESENT ILLNESS:  A 57-year-old male with a recently discovered 5.5   cm juxtarenal abdominal aortic aneurysm, who was sent for potential evaluation   of a fenestrated endograft.  He has no acute back or abdominal pain.    He now returns after cardiac eval in [place] which included a LHC 12/12/17 showing EF 45%, non-obstructive coronary Dz.    PAST MEDICAL HISTORY:  1.  Known coronary artery disease, status post PCI with three stents in 2014.    Denies chest or abdominal pain.  Recent LHC as above  2.  Active smoking.  3.  Hyperlipidemia.  4.  Prediabetes.    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    FAMILY HISTORY:  Positive for coronary artery disease.    SOCIAL HISTORY:  Current smoking, half pack per day.    MEDICATIONS:  Include aspirin     He was also on statin.    ALLERGIES:  TO CODEINE, WHICH CAUSES ITCHING.    REVIEW OF SYSTEMS:  Admits to one block claudication.  Chronic blurred vision as   above.  All other systems include eyes, ENT, , respiratory, musculoskeletal,   breast, psychiatric, lymph, allergy, and immune are negative.    PHYSICAL EXAMINATION:  VITAL SIGNS:  See nursing notes.  GENERAL:  In no acute distress.  RESPIRATORY:  Normal effort.  Clear to auscultation.  CARDIAC:  Regular rate and rhythm.  PMI nondisplaced.  No murmur.  VASCULAR:  2+ radial and brachial pulses, femoral pulses are 1+, which may be   due to body habitus.  Pedal pulses are 2+ bilaterally.  EXTREMITIES:  Have some modest edema.  No varicosities, ulcerations, gangrene,   or digital petechiae.  Without clubbing or cyanosis.  ABDOMEN:  No masses or tenderness.  No hepatosplenomegaly.  His aortic aneurysm   is not readily palpable, which may be due to his body habitus.  EYES:  Normal conjunctivae and lids.  ENT:  Poor dentition.  NECK:  No JVD.  No thyromegaly.  MUSCULOSKELETAL:  No kyphosis or scoliosis.  SKIN:  Warm and  dry.  NEUROLOGIC:  Alert and oriented x3.  Normal mood and affect.  Midline tongue.    No speech difficulty or hoarseness, with 5/5 motor strength in all extremities.    IMAGING:  Outside CT scan was personally reviewed.  It demonstrates,  1.  Juxtarenal 5.5 cm AAA.  2.  Right renal artery much higher than left.  3.  Less than 5 mm between the right renal artery and the SMA.  4.  Small right common iliac artery aneurysm.  5.  Significant size LORENZO but SMA is widely patent without stenosis.    ASSESSMENT:      1. A 5.5 cm juxtarenal abdominal aortic aneurysm.  2. Systolic dysfunction with EF 45%.    Non-obstructive CAD by recent [facility]    Unfortunately, he is not an anatomic candidate for fenestrated device because of   the close proximity of the renal artery to his SMA.  For the remaining   purposes, there is not enough room to put both a fenestration and a scallop.    RECOMMENDATIONS:  1.    Open Juxtarenal AAA repair 1/8/18.  2.  Clamp above L Renal, likely below R renal  3. Clr liquids there day before an Mg Citrate bowel prep  4. Cardiac anesthesia    I have explained the risks, benefits and alternatives for this procedure in detail.  The patient and family voices understanding and all questions have be answered, and agrees to proceed with the procedure.    CARMELITA Mendez III, MD, FACS  Professor and Chief, Vascular and Endovascular Surgery      CC: VAL LEE M.D.

## 2018-01-08 NOTE — TRANSFER OF CARE
Anesthesia Transfer of Care Note    Patient: Georgi Jo    Procedure(s) Performed: Procedure(s) (LRB):  Open Repair-Aneurysm-Abdominal-Aortic (Aaa)- Graph placement (N/A)    Patient location: ICU    Anesthesia Type: general    Transport from OR: Transported from OR on 6-10 L/min O2 by face mask with adequate spontaneous ventilation    Post pain: adequate analgesia    Post assessment: no apparent anesthetic complications    Post vital signs: stable    Level of consciousness: awake    Nausea/Vomiting: no nausea/vomiting    Complications: none          Last vitals:   Visit Vitals  BP (!) 144/85 (BP Location: Right arm, Patient Position: Lying)   Pulse 84   Temp 36.8 °C (98.2 °F) (Oral)   Resp 19   Ht 6' (1.829 m)   Wt 108.9 kg (240 lb)   SpO2 98%   BMI 32.55 kg/m²

## 2018-01-08 NOTE — INTERVAL H&P NOTE
The patient has been examined and the H&P has been reviewed:    I concur with the findings and no changes have occurred since H&P was written.    Anesthesia/Surgery risks, benefits and alternative options discussed and understood by patient/family.          Active Hospital Problems    Diagnosis  POA    AAA (abdominal aortic aneurysm) [I71.4]  Yes      Resolved Hospital Problems    Diagnosis Date Resolved POA   No resolved problems to display.

## 2018-01-08 NOTE — H&P
Georgi Jo  01/08/2018    HPI:  Patient is a 57 y.o. 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 He had supra-renal clamp in place for approximately 29 minutes. He was successfully extubated at the completion of the procedure.     Past Medical History:   Diagnosis Date    Hyperlipidemia     Myocardial infarction      Past Surgical History:   Procedure Laterality Date    CORONARY ANGIOPLASTY WITH STENT PLACEMENT  2007    X3    EYE SURGERY Right 2002     History reviewed. No pertinent family history.  Social History     Social History    Marital status:      Spouse name: N/A    Number of children: N/A    Years of education: N/A     Occupational History    Not on file.     Social History Main Topics    Smoking status: Current Every Day Smoker     Packs/day: 0.50     Years: 40.00    Smokeless tobacco: Never Used    Alcohol use Yes      Comment: rarely    Drug use: No    Sexual activity: Not on file     Other Topics Concern    Not on file     Social History Narrative    No narrative on file     No current facility-administered medications on file prior to encounter.      Current Outpatient Prescriptions on File Prior to Encounter   Medication Sig    atorvastatin (LIPITOR) 40 MG tablet Take 1 tablet (40 mg total) by mouth once daily.    aspirin 81 MG Chew Take 1 tablet (81 mg total) by mouth once daily.       REVIEW OF SYSTEMS:  General: negative; ENT: negative; Allergy and Immunology: negative; Hematological and Lymphatic: Negative; Endocrine: negative; Respiratory: no cough, shortness of breath, or wheezing; Cardiovascular: no chest pain or dyspnea on exertion; Gastrointestinal: no abdominal pain/back, change in bowel habits, or bloody stools;  Genito-Urinary: no dysuria, trouble voiding, or hematuria; Musculoskeletal: negative  Neurological: no TIA or stroke symptoms    PHYSICAL EXAM:      Pulse: 85  Temp: 97.5 °F (36.4 °C)      General appearance:  Alert, well-appearing, and in no distress.   Neurological: Normal speech, no focal findings noted; CN II - XII grossly intact           Musculoskeletal: Digits/nail without cyanosis/clubbing.  Normal muscle strength/tone                Chest:  Clear to auscultation, no wheezes, rales or rhonchi, symmetric air entry     No use of accessory muscles             Cardiac: Normal rate and regular rhythm, S1 and S2 normal; PMI non-displaced          Abdomen: nondistended, no masses or organomegaly      Extremities: + pedal pulses palpable.     No pedal edema       LAB RESULTS:  Lab Results   Component Value Date    K 4.2 12/19/2017    CREATININE 0.8 12/19/2017     Lab Results   Component Value Date    WBC 8.96 12/19/2017    HCT 41 01/08/2018    HCT 37 01/08/2018    HCT 34 (L) 01/08/2018     12/19/2017     No results found for: HGBA1C  IMAGING:    PLAN:  57 y.o. male with a history of CAD s/p stenting x3 in 2014, ischemic cardiomyopathy, HLD, prediabetes, AAA and right iliac artery aneurysms s/p repair 1/8/18.    Neuro:  Post Operative Pain:  - dilaudid PCA 0.1 mg q6 minutes max of 1 mg/hr  - scheduled acetaminophen 1g q8    Pulm:  Tobacco Abuse:  - nicotine patch 14 mg/24hr  - duo-nebs scheduled q6 while awake    Cardiac:  Hypertension:  - labetalol 10 mg IV q4 PRN for SBP greater than 155    CAD and Ischemic Cardiomyopathy:  - Goal MAP of >65  - maintain euvolemia    Renal:  Velez in place  Monitor and trend UOP  Monitor creatinine and BUN    FENGI:  GI prophylaxis:  - famotidine 20 mg BID    Maintenance Fluids:  -  ml/hr    Electrolytes:  - replenish as needed  - daily CMP, mag, phos    Heme/Onc:  DVT prophylaxis: heparin 5000 units BID  Daily CBC, INR, PT, PTT    Infectious Disease:  Cefazolin q8 for  2 doses postoperatively    Disposition:  Continue ICU care    Rodrick Sanchez MD PGY2  Anesthesia/SICU

## 2018-01-08 NOTE — ANESTHESIA PROCEDURE NOTES
Arterial    Diagnosis: AAA    Patient location during procedure: done in OR  Procedure start time: 1/8/2018 7:10 AM  Timeout: 1/8/2018 7:10 AM  Procedure end time: 1/8/2018 7:15 AM  Staffing  Anesthesiologist: NEVA CROW  Resident/CRNA: LUBNA SWIFT  Performed: resident/CRNA   Anesthesiologist was present at the time of the procedure.  Preanesthetic Checklist  Completed: patient identified, site marked, surgical consent, pre-op evaluation, timeout performed, IV checked, risks and benefits discussed, monitors and equipment checked and anesthesia consent givenArterial  Skin Prep: chlorhexidine gluconate  Local Infiltration: lidocaine  Orientation: left  Location: radial  Catheter Size: 20 G  Catheter placement by Anatomical landmarks. Heme positive aspiration all ports.Insertion Attempts: 2  Assessment  Dressing: secured with tape and tegaderm  Patient: Tolerated well

## 2018-01-09 LAB
ALBUMIN SERPL BCP-MCNC: 2.6 G/DL
ALBUMIN SERPL BCP-MCNC: 2.6 G/DL
ALP SERPL-CCNC: 40 U/L
ALP SERPL-CCNC: 40 U/L
ALT SERPL W/O P-5'-P-CCNC: 17 U/L
ALT SERPL W/O P-5'-P-CCNC: 17 U/L
ANION GAP SERPL CALC-SCNC: 7 MMOL/L
ANION GAP SERPL CALC-SCNC: 7 MMOL/L
APTT BLDCRRT: 24 SEC
AST SERPL-CCNC: 26 U/L
AST SERPL-CCNC: 26 U/L
BASOPHILS # BLD AUTO: 0.06 K/UL
BASOPHILS NFR BLD: 0.6 %
BILIRUB SERPL-MCNC: 1.2 MG/DL
BILIRUB SERPL-MCNC: 1.2 MG/DL
BUN SERPL-MCNC: 22 MG/DL
BUN SERPL-MCNC: 22 MG/DL
CALCIUM SERPL-MCNC: 8 MG/DL
CALCIUM SERPL-MCNC: 8 MG/DL
CHLORIDE SERPL-SCNC: 108 MMOL/L
CHLORIDE SERPL-SCNC: 108 MMOL/L
CO2 SERPL-SCNC: 25 MMOL/L
CO2 SERPL-SCNC: 25 MMOL/L
CREAT SERPL-MCNC: 1.7 MG/DL
CREAT SERPL-MCNC: 1.7 MG/DL
DIFFERENTIAL METHOD: ABNORMAL
EOSINOPHIL # BLD AUTO: 0.1 K/UL
EOSINOPHIL NFR BLD: 0.5 %
ERYTHROCYTE [DISTWIDTH] IN BLOOD BY AUTOMATED COUNT: 14.2 %
EST. GFR  (AFRICAN AMERICAN): 50.3 ML/MIN/1.73 M^2
EST. GFR  (AFRICAN AMERICAN): 50.3 ML/MIN/1.73 M^2
EST. GFR  (NON AFRICAN AMERICAN): 43.5 ML/MIN/1.73 M^2
EST. GFR  (NON AFRICAN AMERICAN): 43.5 ML/MIN/1.73 M^2
GLUCOSE SERPL-MCNC: 115 MG/DL
GLUCOSE SERPL-MCNC: 115 MG/DL
HCT VFR BLD AUTO: 38.9 %
HGB BLD-MCNC: 12.7 G/DL
IMM GRANULOCYTES # BLD AUTO: 0.03 K/UL
IMM GRANULOCYTES NFR BLD AUTO: 0.3 %
INR PPP: 1
LACTATE SERPL-SCNC: 1 MMOL/L
LYMPHOCYTES # BLD AUTO: 2 K/UL
LYMPHOCYTES NFR BLD: 19.2 %
MAGNESIUM SERPL-MCNC: 2.3 MG/DL
MAGNESIUM SERPL-MCNC: 2.3 MG/DL
MCH RBC QN AUTO: 29.3 PG
MCHC RBC AUTO-ENTMCNC: 32.6 G/DL
MCV RBC AUTO: 90 FL
MONOCYTES # BLD AUTO: 1.2 K/UL
MONOCYTES NFR BLD: 11.6 %
NEUTROPHILS # BLD AUTO: 7.1 K/UL
NEUTROPHILS NFR BLD: 67.8 %
NRBC BLD-RTO: 0 /100 WBC
PHOSPHATE SERPL-MCNC: 4.9 MG/DL
PHOSPHATE SERPL-MCNC: 4.9 MG/DL
PLATELET # BLD AUTO: 163 K/UL
PMV BLD AUTO: 9.9 FL
POTASSIUM SERPL-SCNC: 4.6 MMOL/L
POTASSIUM SERPL-SCNC: 4.6 MMOL/L
PROT SERPL-MCNC: 5.4 G/DL
PROT SERPL-MCNC: 5.4 G/DL
PROTHROMBIN TIME: 10.8 SEC
RBC # BLD AUTO: 4.34 M/UL
SODIUM SERPL-SCNC: 140 MMOL/L
SODIUM SERPL-SCNC: 140 MMOL/L
WBC # BLD AUTO: 10.4 K/UL

## 2018-01-09 PROCEDURE — 25000242 PHARM REV CODE 250 ALT 637 W/ HCPCS: Performed by: SURGERY

## 2018-01-09 PROCEDURE — 99233 SBSQ HOSP IP/OBS HIGH 50: CPT | Mod: ,,, | Performed by: SURGERY

## 2018-01-09 PROCEDURE — 63600175 PHARM REV CODE 636 W HCPCS: Performed by: SURGERY

## 2018-01-09 PROCEDURE — 85610 PROTHROMBIN TIME: CPT

## 2018-01-09 PROCEDURE — 80053 COMPREHEN METABOLIC PANEL: CPT

## 2018-01-09 PROCEDURE — 27000221 HC OXYGEN, UP TO 24 HOURS

## 2018-01-09 PROCEDURE — 94640 AIRWAY INHALATION TREATMENT: CPT

## 2018-01-09 PROCEDURE — 94799 UNLISTED PULMONARY SVC/PX: CPT

## 2018-01-09 PROCEDURE — 63600175 PHARM REV CODE 636 W HCPCS: Performed by: STUDENT IN AN ORGANIZED HEALTH CARE EDUCATION/TRAINING PROGRAM

## 2018-01-09 PROCEDURE — S4991 NICOTINE PATCH NONLEGEND: HCPCS | Performed by: SURGERY

## 2018-01-09 PROCEDURE — S0028 INJECTION, FAMOTIDINE, 20 MG: HCPCS | Performed by: SURGERY

## 2018-01-09 PROCEDURE — 99900035 HC TECH TIME PER 15 MIN (STAT)

## 2018-01-09 PROCEDURE — 94770 HC EXHALED C02 TEST: CPT

## 2018-01-09 PROCEDURE — 85025 COMPLETE CBC W/AUTO DIFF WBC: CPT

## 2018-01-09 PROCEDURE — 20000000 HC ICU ROOM

## 2018-01-09 PROCEDURE — 94664 DEMO&/EVAL PT USE INHALER: CPT

## 2018-01-09 PROCEDURE — 83605 ASSAY OF LACTIC ACID: CPT

## 2018-01-09 PROCEDURE — 83735 ASSAY OF MAGNESIUM: CPT

## 2018-01-09 PROCEDURE — 25000003 PHARM REV CODE 250: Performed by: SURGERY

## 2018-01-09 PROCEDURE — 94761 N-INVAS EAR/PLS OXIMETRY MLT: CPT

## 2018-01-09 PROCEDURE — 84100 ASSAY OF PHOSPHORUS: CPT

## 2018-01-09 PROCEDURE — 85730 THROMBOPLASTIN TIME PARTIAL: CPT

## 2018-01-09 RX ORDER — ACETAMINOPHEN 10 MG/ML
1000 INJECTION, SOLUTION INTRAVENOUS EVERY 8 HOURS
Status: COMPLETED | OUTPATIENT
Start: 2018-01-09 | End: 2018-01-10

## 2018-01-09 RX ADMIN — ACETAMINOPHEN 1000 MG: 10 INJECTION, SOLUTION INTRAVENOUS at 05:01

## 2018-01-09 RX ADMIN — LABETALOL HYDROCHLORIDE 10 MG: 5 INJECTION, SOLUTION INTRAVENOUS at 04:01

## 2018-01-09 RX ADMIN — SODIUM CHLORIDE, SODIUM LACTATE, POTASSIUM CHLORIDE, AND CALCIUM CHLORIDE: 600; 310; 30; 20 INJECTION, SOLUTION INTRAVENOUS at 01:01

## 2018-01-09 RX ADMIN — SODIUM CHLORIDE, SODIUM LACTATE, POTASSIUM CHLORIDE, AND CALCIUM CHLORIDE: 600; 310; 30; 20 INJECTION, SOLUTION INTRAVENOUS at 08:01

## 2018-01-09 RX ADMIN — HEPARIN SODIUM 5000 UNITS: 5000 INJECTION, SOLUTION INTRAVENOUS; SUBCUTANEOUS at 08:01

## 2018-01-09 RX ADMIN — MUPIROCIN 1 G: 20 OINTMENT TOPICAL at 08:01

## 2018-01-09 RX ADMIN — FAMOTIDINE 20 MG: 10 INJECTION, SOLUTION INTRAVENOUS at 08:01

## 2018-01-09 RX ADMIN — IPRATROPIUM BROMIDE AND ALBUTEROL SULFATE 3 ML: .5; 3 SOLUTION RESPIRATORY (INHALATION) at 01:01

## 2018-01-09 RX ADMIN — IPRATROPIUM BROMIDE AND ALBUTEROL SULFATE 3 ML: .5; 3 SOLUTION RESPIRATORY (INHALATION) at 07:01

## 2018-01-09 RX ADMIN — IPRATROPIUM BROMIDE AND ALBUTEROL SULFATE 3 ML: .5; 3 SOLUTION RESPIRATORY (INHALATION) at 08:01

## 2018-01-09 RX ADMIN — NICOTINE 1 PATCH: 14 PATCH, EXTENDED RELEASE TRANSDERMAL at 08:01

## 2018-01-09 RX ADMIN — SODIUM CHLORIDE, SODIUM LACTATE, POTASSIUM CHLORIDE, AND CALCIUM CHLORIDE: 600; 310; 30; 20 INJECTION, SOLUTION INTRAVENOUS at 05:01

## 2018-01-09 RX ADMIN — Medication: at 10:01

## 2018-01-09 RX ADMIN — ACETAMINOPHEN 1000 MG: 10 INJECTION, SOLUTION INTRAVENOUS at 01:01

## 2018-01-09 RX ADMIN — ACETAMINOPHEN 1000 MG: 10 INJECTION, SOLUTION INTRAVENOUS at 09:01

## 2018-01-09 NOTE — PLAN OF CARE
"   01/09/18 1041   Discharge Assessment   Assessment Type Discharge Planning Assessment   Confirmed/corrected address and phone number on facesheet? Yes   Assessment information obtained from? Patient   Expected Length of Stay (days) 4   Communicated expected length of stay with patient/caregiver yes   Prior to hospitilization cognitive status: Alert/Oriented;No Deficits   Prior to hospitalization functional status: Independent   Current cognitive status: Alert/Oriented;No Deficits   Current Functional Status: Independent   Lives With spouse   Able to Return to Prior Arrangements yes   Is patient able to care for self after discharge? Yes   Who are your caregiver(s) and their phone number(s)? lives w Maureen, "off and on" but he will go home w her when d/c   maureen ram    Patient's perception of discharge disposition home or selfcare   Readmission Within The Last 30 Days no previous admission in last 30 days   Patient currently being followed by outpatient case management? No   Patient currently receives any other outside agency services? No   Equipment Currently Used at Home none   Do you have any problems affording any of your prescribed medications? No   Is the patient taking medications as prescribed? yes   Does the patient have transportation home? Yes   Transportation Available family or friend will provide   Does the patient receive services at the [facility]mad[facility] Clinic? No  (plavix)   Discharge Plan A Home with family   Discharge Plan B Home with family;Home Health   Patient/Family In Agreement With Plan yes        01/09/18 1041   Discharge Assessment   Assessment Type Discharge Planning Assessment   Confirmed/corrected address and phone number on facesheet? Yes   Assessment information obtained from? Patient   Expected Length of Stay (days) 4   Communicated expected length of stay with patient/caregiver yes   Prior to hospitilization cognitive status: Alert/Oriented;No Deficits   Prior to hospitalization " "functional status: Independent   Current cognitive status: Alert/Oriented;No Deficits   Current Functional Status: Independent   Lives With spouse   Able to Return to Prior Arrangements yes   Is patient able to care for self after discharge? Yes   Who are your caregiver(s) and their phone number(s)? mai w Tabatha, "off and on" but he will go home w her when d/c   tabatha ram    Patient's perception of discharge disposition home or selfcare   Readmission Within The Last 30 Days no previous admission in last 30 days   Patient currently being followed by outpatient case management? No   Patient currently receives any other outside agency services? No   Equipment Currently Used at Home none   Do you have any problems affording any of your prescribed medications? No   Is the patient taking medications as prescribed? yes   Does the patient have transportation home? Yes   Transportation Available family or friend will provide   Does the patient receive services at the Coumadin Clinic? No  (plavix)   Discharge Plan A Home with family   Discharge Plan B Home with family;Home Health   Patient/Family In Agreement With Plan yes     "

## 2018-01-09 NOTE — PLAN OF CARE
Problem: Spiritual Distress, Risk/Actual (Adult,Obstetrics,Pediatric)  Intervention: Facilitate Personal Exploration/Expression of Spirituality  Provided initial visit. Pt present. Introduced and offered pastoral support with reflective listening and prayer. Pt expressed reliance on prayer for strength. Family in [facility]. Pt made aware of 's presence as needed.

## 2018-01-09 NOTE — PLAN OF CARE
Problem: Patient Care Overview  Goal: Plan of Care Review  Outcome: Ongoing (interventions implemented as appropriate)  Patient and patient's family updated on plan of care. No acute events during shift. VSS. Currently on 6L nasal cannula with humidification and O2 saturation ranging from 90 and >.Patient OOB to chair for majority of the day. Patient remains NPO. Urine output ranging from 60-75ml/hr. Midline incision dressed with telfa island dressing, which is clean, dry, and intact. Skin on sacrum, elbows, and heels are clean, dry, and intact.  Abdomen is distended, firm, and tender. Dilaudid PCA pump in use for pain. LR at 125ml/hr. All questions and concerns acknowledged and answered. See flow sheet for full assessment. Will continue to monitor closely.

## 2018-01-09 NOTE — PLAN OF CARE
Problem: Patient Care Overview  Goal: Plan of Care Review  Outcome: Revised  Patient and patient's family updated on plan of care. Patient admitted to SICU from OR after AAA repair. Pateint extubated in the OR and placed on a simple face mask. Pateint's vital signs have been stable during shift. HR 70-80's NSR; SBP <150 and MAP > 60. O2 saturation 100% on 5 L nasal cannula. Dilaudid PCA pump started. LR at 125ml/hr. Urine output ranging 60-75ml/hr. Patient is NPO. All questions and concerns acknowledged and answered. See flow sheet for full assessment. Will continue to monitor closely.

## 2018-01-09 NOTE — SUBJECTIVE & OBJECTIVE
VSS. Afebrile. Adequate UOP. On 5L NC this morning. Pain moderately controlled with PCA.     Medications:  Continuous Infusions:   hydromorphone in 0.9 % NaCl 6 mg/30 ml      lactated ringers 125 mL/hr at 01/09/18 0700     Scheduled Meds:   albuterol-ipratropium 2.5mg-0.5mg/3mL  3 mL Nebulization Q6H WAKE    famotidine (PF)  20 mg Intravenous BID    heparin (porcine)  5,000 Units Subcutaneous Q12H    mupirocin  1 g Nasal BID    nicotine  1 patch Transdermal Daily     PRN Meds:labetalol, naloxone     Objective:     Vital Signs (Most Recent):  Temp: 98.5 °F (36.9 °C) (01/09/18 0300)  Pulse: 89 (01/09/18 0630)  Resp: 16 (01/09/18 0630)  BP: 136/66 (01/09/18 0600)  SpO2: (!) 91 % (01/09/18 0630) Vital Signs (24h Range):  Temp:  [97.5 °F (36.4 °C)-98.5 °F (36.9 °C)] 98.5 °F (36.9 °C)  Pulse:  [78-92] 89  Resp:  [7-22] 16  SpO2:  [91 %-97 %] 91 %  BP: ()/(62-86) 136/66  Arterial Line BP: ()/(55-76) 119/66       Date 01/09/18 0700 - 01/10/18 0659   Shift 1854-4134 6445-2232 9137-2145 24 Hour Total   I  N  T  A  K  E   Shift Total  (mL/kg)       O  U  T  P  U  T   Urine  (mL/kg/hr) 60   60    Shift Total  (mL/kg) 60  (0.5)   60  (0.5)   Weight (kg) 115.6 115.6 115.6 115.6       Physical Exam   WD. WN. NAD  RRR  Unlabored breathing. On NC.   Abdomen: Distended, soft. Midline incision with dressing in place - c/di.   2+ DP pulses bilaterally.     Significant Labs:  ABGs:   Recent Labs  Lab 01/08/18  1622   PH 7.309*   PCO2 51.2*   PO2 81   HCO3 25.8   POCSATURATED 95   BE -1     CBC:   Recent Labs  Lab 01/09/18  0330   WBC 10.40   RBC 4.34*   HGB 12.7*   HCT 38.9*      MCV 90   MCH 29.3   MCHC 32.6     CMP:   Recent Labs  Lab 01/09/18  0330   *  115*   CALCIUM 8.0*  8.0*   ALBUMIN 2.6*  2.6*   PROT 5.4*  5.4*     140   K 4.6  4.6   CO2 25  25     108   BUN 22*  22*   CREATININE 1.7*  1.7*   ALKPHOS 40*  40*   ALT 17  17   AST 26  26   BILITOT 1.2*  1.2*     Lactic  Acid:   Recent Labs  Lab 01/09/18  0330   LACTATE 1.0       Significant Diagnostics:  CXR: X-ray Chest 1 View    Result Date: 1/9/2018  Interval improved aeration of the right upper lobe.  Persistent left retrocardiac opacity. Electronically signed by: EMMA LOPEZ MD Date:     01/09/18 Time:    06:44     X-ray Chest 1 View    Result Date: 1/8/2018  1.  Apparent enlargement of the cardiomediastinal silhouette most likely due to AP portable technique and submaximal inspiration. 2.  Interval development of focal abnormal opacification of the left lung base and at the right lung apex, consistent with volume loss.  In addition, the opacification at the right lung apex is more extensive and other consolidation due to infection, aspiration, or hemorrhage cannot be completely excluded.  Followup chest radiographs are suggested. Epic notification system activated. Electronically signed by: МАРИНА GIMENEZ MD Date:     01/08/18 Time:    14:40

## 2018-01-09 NOTE — ASSESSMENT & PLAN NOTE
56 yo male with hx of AAA s/p open AAA repair     Neuro:  - PCA for pain. Will increase for increased pain control  - continue scheduled IV tylenol  - AAOx3    Cardio:  - Keep SBP < 160. Labetalol PRN   - no vasopressors  - continue neurovascular checks   - continue A line until stepdown     Resp:   - CXR showing RUL consolidation, possible mucus plug  - aggressive pulm toilet - IS, CPT   - on 5L NC. Wean NC as tolerated    FENGI:  - NPO. Lozenges and hard candy/gum okay ONLY  - LR @ 125  - t.bili decreasing    Renal:  - KRISTIAN after 30 min clamp time. Cr 1.7 today  - continue cruz  - adequate UOP    Heme/ID:  - H/H stable  - no leukocytosis, afebrile  - periop abx      DVT/ulcer ppx     Dispo: continue ICU care for one day. Probably step down tomorrow

## 2018-01-09 NOTE — PLAN OF CARE
Problem: Patient Care Overview  Goal: Plan of Care Review  Outcome: Ongoing (interventions implemented as appropriate)  POC reviewed with patient and daughter. A&Ox4. Pt on 5L NC. MIVF infusing. Dilaudid PCA in use. Adequate UOP throughout shift. VSS. See flowsheet for details. All questions answered by RN. Will continue to monitor.

## 2018-01-09 NOTE — PROGRESS NOTES
Georgi Jo  01/09/2018    HPI:  Patient is a 57 y.o. 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 He had supra-renal clamp in place for approximately 29 minutes. He was successfully extubated at the completion of the procedure.     Interval history:  The post operative chest x-ray demonstrates what appears to be right upper lobe atelectasis. The patient endorses abdominal pain overnight with relief provided by PCA. He denies passing flatus or BM. No complaints otherwise.    Past Medical History:   Diagnosis Date    Hyperlipidemia     Myocardial infarction      Past Surgical History:   Procedure Laterality Date    CORONARY ANGIOPLASTY WITH STENT PLACEMENT  2007    X3    EYE SURGERY Right 2002     History reviewed. No pertinent family history.  Social History     Social History    Marital status:      Spouse name: N/A    Number of children: N/A    Years of education: N/A     Occupational History    Not on file.     Social History Main Topics    Smoking status: Current Every Day Smoker     Packs/day: 0.50     Years: 40.00    Smokeless tobacco: Never Used    Alcohol use Yes      Comment: rarely    Drug use: No    Sexual activity: Not on file     Other Topics Concern    Not on file     Social History Narrative    No narrative on file     No current facility-administered medications on file prior to encounter.      Current Outpatient Prescriptions on File Prior to Encounter   Medication Sig    atorvastatin (LIPITOR) 40 MG tablet Take 1 tablet (40 mg total) by mouth once daily.    aspirin 81 MG Chew Take 1 tablet (81 mg total) by mouth once daily.       REVIEW OF SYSTEMS:  General: negative; ENT: negative; Allergy and Immunology: negative; Hematological  and Lymphatic: Negative; Endocrine: negative; Respiratory: no cough, shortness of breath, or wheezing; Cardiovascular: no chest pain or dyspnea on exertion; Gastrointestinal: no abdominal pain/back, change in bowel habits, or bloody stools; Genito-Urinary: no dysuria, trouble voiding, or hematuria; Musculoskeletal: negative  Neurological: no TIA or stroke symptoms    PHYSICAL EXAM:      Pulse: 90  Temp: 98.5 °F (36.9 °C)      General appearance:  Alert, well-appearing, and in no distress.   Neurological: Normal speech, no focal findings noted; CN II - XII grossly intact           Musculoskeletal: Digits/nail without cyanosis/clubbing.  Normal muscle strength/tone                Chest:  Clear to auscultation, no wheezes, rales or rhonchi, symmetric air entry     No use of accessory muscles             Cardiac: Normal rate and regular rhythm, S1 and S2 normal; PMI non-displaced          Abdomen: nondistended, no masses or organomegaly      Extremities: + pedal pulses palpable.     No pedal edema       LAB RESULTS:  Lab Results   Component Value Date    K 4.6 01/09/2018    K 4.6 01/09/2018    K 5.0 01/08/2018    CREATININE 1.7 (H) 01/09/2018    CREATININE 1.7 (H) 01/09/2018    CREATININE 1.6 (H) 01/08/2018     Lab Results   Component Value Date    WBC 10.40 01/09/2018    WBC 11.27 01/08/2018    WBC 17.78 (H) 01/08/2018    HCT 38.9 (L) 01/09/2018    HCT 39.9 (L) 01/08/2018    HCT 41.8 01/08/2018     01/09/2018     01/08/2018     01/08/2018     No results found for: HGBA1C  IMAGING:    PLAN:  57 y.o. male with a history of CAD s/p stenting x3 in 2014, ischemic cardiomyopathy, HLD, prediabetes, AAA and right iliac artery aneurysms s/p repair 1/8/18.    Neuro:  Post Operative Pain:  - dilaudid PCA 0.1 mg q6 minutes max of 1 mg/hr  - scheduled acetaminophen 1g q8    Pulm:  Tobacco Abuse:  - nicotine patch 14 mg/24hr  - duo-nebs scheduled q6 while awake    Right upper lobe atelectasis:  - CPT ordered  -  incentive spirometry  - encourage sigh breaths/ intermittent deep breathing    Cardiac:  Hypertension:  - labetalol 10 mg IV q4 PRN for SBP greater than 155    CAD and Ischemic Cardiomyopathy:  - Goal MAP of >65  - maintain euvolemia    Renal:  Velez in place  Monitor and trend UOP (1.6L/0.6 ml/kg/hr)    KRISTIAN:  Creatinine 1.7 this AM from 1.6  BUN 22  Will ensure adequate fluid resuscitation    FENGI:  GI prophylaxis:  - famotidine 20 mg BID    Maintenance Fluids:  -  ml/hr    Electrolytes:  - replenish as needed  - daily CMP, mag, phos    Heme/Onc:  DVT prophylaxis: heparin 5000 units BID  Daily CBC, INR, PT, PTT    Infectious Disease:  Cefazolin q8 for 2 doses postoperatively    Disposition:  Continue ICU care with potential stepdown this afternoon or tomorrow AM    Rodrick Sanchez MD PGY2  Anesthesia/SICU

## 2018-01-09 NOTE — PROGRESS NOTES
[facility]  Vascular Surgery  Progress Note    Patient Name: Georgi Jo  MRN: [id number]  Admission Date: 1/8/2018  Primary Care Provider: Primary Doctor No    Subjective:     Interval History:     Post-Op Info:  Procedure(s) (LRB):  Open Repair-Aneurysm-Abdominal-Aortic (Aaa)- Graph placement (N/A)   1 Day Post-Op     VSS. Afebrile. Adequate UOP. On 5L NC this morning. Pain moderately controlled with PCA.     Medications:  Continuous Infusions:   hydromorphone in 0.9 % NaCl 6 mg/30 ml      lactated ringers 125 mL/hr at 01/09/18 0700     Scheduled Meds:   albuterol-ipratropium 2.5mg-0.5mg/3mL  3 mL Nebulization Q6H WAKE    famotidine (PF)  20 mg Intravenous BID    heparin (porcine)  5,000 Units Subcutaneous Q12H    mupirocin  1 g Nasal BID    nicotine  1 patch Transdermal Daily     PRN Meds:labetalol, naloxone     Objective:     Vital Signs (Most Recent):  Temp: 98.5 °F (36.9 °C) (01/09/18 0300)  Pulse: 89 (01/09/18 0630)  Resp: 16 (01/09/18 0630)  BP: 136/66 (01/09/18 0600)  SpO2: (!) 91 % (01/09/18 0630) Vital Signs (24h Range):  Temp:  [97.5 °F (36.4 °C)-98.5 °F (36.9 °C)] 98.5 °F (36.9 °C)  Pulse:  [78-92] 89  Resp:  [7-22] 16  SpO2:  [91 %-97 %] 91 %  BP: ()/(62-86) 136/66  Arterial Line BP: ()/(55-76) 119/66       Date 01/09/18 0700 - 01/10/18 0659   Shift 8522-7934 9688-8167 7169-0287 24 Hour Total   I  N  T  A  K  E   Shift Total  (mL/kg)       O  U  T  P  U  T   Urine  (mL/kg/hr) 60   60    Shift Total  (mL/kg) 60  (0.5)   60  (0.5)   Weight (kg) 115.6 115.6 115.6 115.6       Physical Exam   WD. WN. NAD  RRR  Unlabored breathing. On NC.   Abdomen: Distended, soft. Midline incision with dressing in place - c/di.   2+ DP pulses bilaterally.     Significant Labs:  ABGs:   Recent Labs  Lab 01/08/18  1622   PH 7.309*   PCO2 51.2*   PO2 81   HCO3 25.8   POCSATURATED 95   BE -1     CBC:   Recent Labs  Lab 01/09/18  0330   WBC 10.40   RBC 4.34*   HGB 12.7*   HCT 38.9*       MCV 90   MCH 29.3   MCHC 32.6     CMP:   Recent Labs  Lab 01/09/18  0330   *  115*   CALCIUM 8.0*  8.0*   ALBUMIN 2.6*  2.6*   PROT 5.4*  5.4*     140   K 4.6  4.6   CO2 25  25     108   BUN 22*  22*   CREATININE 1.7*  1.7*   ALKPHOS 40*  40*   ALT 17  17   AST 26  26   BILITOT 1.2*  1.2*     Lactic Acid:   Recent Labs  Lab 01/09/18  0330   LACTATE 1.0       Significant Diagnostics:  CXR: X-ray Chest 1 View    Result Date: 1/9/2018  Interval improved aeration of the right upper lobe.  Persistent left retrocardiac opacity. Electronically signed by: EMMA LOPEZ MD Date:     01/09/18 Time:    06:44     X-ray Chest 1 View    Result Date: 1/8/2018  1.  Apparent enlargement of the cardiomediastinal silhouette most likely due to AP portable technique and submaximal inspiration. 2.  Interval development of focal abnormal opacification of the left lung base and at the right lung apex, consistent with volume loss.  In addition, the opacification at the right lung apex is more extensive and other consolidation due to infection, aspiration, or hemorrhage cannot be completely excluded.  Followup chest radiographs are suggested. Epic notification system activated. Electronically signed by: МАРИНА GIMENEZ MD Date:     01/08/18 Time:    14:40     Assessment/Plan:     * AAA (abdominal aortic aneurysm)    58 yo male with hx of AAA s/p open AAA repair     Neuro:  - PCA for pain. Will increase for increased pain control  - continue scheduled IV tylenol  - AAOx3    Cardio:  - Keep SBP < 160. Labetalol PRN   - no vasopressors  - continue neurovascular checks   - continue A line until stepdown     Resp:   - CXR showing RUL consolidation, possible mucus plug  - aggressive pulm toilet - IS, CPT   - on 5L NC. Wean NC as tolerated    FENGI:  - NPO. Lozenges and hard candy/gum okay ONLY  - LR @ 125  - t.bili decreasing    Renal:  - KRISTIAN after 30 min clamp time. Cr 1.7 today  - continue cruz  - adequate  UOP    Heme/ID:  - H/H stable  - no leukocytosis, afebrile  - periop abx      DVT/ulcer ppx     Dispo: continue ICU care for one day. Probably step down tomorrow             Jaime Juarez MD  Vascular Surgery  [facility]

## 2018-01-10 LAB
ALBUMIN SERPL BCP-MCNC: 2.3 G/DL
ALP SERPL-CCNC: 39 U/L
ALT SERPL W/O P-5'-P-CCNC: 11 U/L
ANION GAP SERPL CALC-SCNC: 7 MMOL/L
ANION GAP SERPL CALC-SCNC: 8 MMOL/L
APTT BLDCRRT: 24.9 SEC
AST SERPL-CCNC: 21 U/L
BASOPHILS # BLD AUTO: 0.05 K/UL
BASOPHILS NFR BLD: 0.4 %
BILIRUB SERPL-MCNC: 1.7 MG/DL
BLD PROD TYP BPU: NORMAL
BLOOD UNIT EXPIRATION DATE: NORMAL
BLOOD UNIT TYPE CODE: 600
BLOOD UNIT TYPE CODE: 600
BLOOD UNIT TYPE CODE: 6200
BLOOD UNIT TYPE CODE: 6200
BLOOD UNIT TYPE: NORMAL
BUN SERPL-MCNC: 20 MG/DL
BUN SERPL-MCNC: 21 MG/DL
CALCIUM SERPL-MCNC: 8.1 MG/DL
CALCIUM SERPL-MCNC: 8.7 MG/DL
CHLORIDE SERPL-SCNC: 102 MMOL/L
CHLORIDE SERPL-SCNC: 106 MMOL/L
CO2 SERPL-SCNC: 23 MMOL/L
CO2 SERPL-SCNC: 28 MMOL/L
CODING SYSTEM: NORMAL
CREAT SERPL-MCNC: 1.7 MG/DL
CREAT SERPL-MCNC: 1.8 MG/DL
DIFFERENTIAL METHOD: ABNORMAL
DISPENSE STATUS: NORMAL
EOSINOPHIL # BLD AUTO: 0 K/UL
EOSINOPHIL NFR BLD: 0.3 %
ERYTHROCYTE [DISTWIDTH] IN BLOOD BY AUTOMATED COUNT: 14 %
EST. GFR  (AFRICAN AMERICAN): 46.9 ML/MIN/1.73 M^2
EST. GFR  (AFRICAN AMERICAN): 50.3 ML/MIN/1.73 M^2
EST. GFR  (NON AFRICAN AMERICAN): 40.6 ML/MIN/1.73 M^2
EST. GFR  (NON AFRICAN AMERICAN): 43.5 ML/MIN/1.73 M^2
GLUCOSE SERPL-MCNC: 102 MG/DL
GLUCOSE SERPL-MCNC: 95 MG/DL
HCT VFR BLD AUTO: 35 %
HGB BLD-MCNC: 11.4 G/DL
IMM GRANULOCYTES # BLD AUTO: 0.06 K/UL
IMM GRANULOCYTES NFR BLD AUTO: 0.4 %
INR PPP: 1.1
LYMPHOCYTES # BLD AUTO: 1.6 K/UL
LYMPHOCYTES NFR BLD: 11.4 %
MAGNESIUM SERPL-MCNC: 1.9 MG/DL
MAGNESIUM SERPL-MCNC: 2 MG/DL
MCH RBC QN AUTO: 29.7 PG
MCHC RBC AUTO-ENTMCNC: 32.6 G/DL
MCV RBC AUTO: 91 FL
MONOCYTES # BLD AUTO: 1.1 K/UL
MONOCYTES NFR BLD: 7.5 %
NEUTROPHILS # BLD AUTO: 11.4 K/UL
NEUTROPHILS NFR BLD: 80 %
NRBC BLD-RTO: 0 /100 WBC
NUM UNITS TRANS FFP: NORMAL
PHOSPHATE SERPL-MCNC: 2.5 MG/DL
PLATELET # BLD AUTO: 138 K/UL
PMV BLD AUTO: 9.9 FL
POTASSIUM SERPL-SCNC: 4 MMOL/L
POTASSIUM SERPL-SCNC: 4.3 MMOL/L
PROT SERPL-MCNC: 5.4 G/DL
PROTHROMBIN TIME: 11.6 SEC
RBC # BLD AUTO: 3.84 M/UL
SODIUM SERPL-SCNC: 137 MMOL/L
SODIUM SERPL-SCNC: 137 MMOL/L
WBC # BLD AUTO: 14.26 K/UL

## 2018-01-10 PROCEDURE — 63600175 PHARM REV CODE 636 W HCPCS: Performed by: SURGERY

## 2018-01-10 PROCEDURE — 83735 ASSAY OF MAGNESIUM: CPT

## 2018-01-10 PROCEDURE — 63600175 PHARM REV CODE 636 W HCPCS: Performed by: STUDENT IN AN ORGANIZED HEALTH CARE EDUCATION/TRAINING PROGRAM

## 2018-01-10 PROCEDURE — 99233 SBSQ HOSP IP/OBS HIGH 50: CPT | Mod: ,,, | Performed by: SURGERY

## 2018-01-10 PROCEDURE — G8979 MOBILITY GOAL STATUS: HCPCS | Mod: CJ

## 2018-01-10 PROCEDURE — 27000221 HC OXYGEN, UP TO 24 HOURS

## 2018-01-10 PROCEDURE — 25000003 PHARM REV CODE 250: Performed by: STUDENT IN AN ORGANIZED HEALTH CARE EDUCATION/TRAINING PROGRAM

## 2018-01-10 PROCEDURE — 20000000 HC ICU ROOM

## 2018-01-10 PROCEDURE — 94799 UNLISTED PULMONARY SVC/PX: CPT

## 2018-01-10 PROCEDURE — G8978 MOBILITY CURRENT STATUS: HCPCS | Mod: CN

## 2018-01-10 PROCEDURE — 97161 PT EVAL LOW COMPLEX 20 MIN: CPT

## 2018-01-10 PROCEDURE — 80048 BASIC METABOLIC PNL TOTAL CA: CPT

## 2018-01-10 PROCEDURE — 99900035 HC TECH TIME PER 15 MIN (STAT)

## 2018-01-10 PROCEDURE — 94761 N-INVAS EAR/PLS OXIMETRY MLT: CPT

## 2018-01-10 PROCEDURE — 87040 BLOOD CULTURE FOR BACTERIA: CPT | Mod: 59

## 2018-01-10 PROCEDURE — 80053 COMPREHEN METABOLIC PANEL: CPT

## 2018-01-10 PROCEDURE — 97116 GAIT TRAINING THERAPY: CPT

## 2018-01-10 PROCEDURE — 25000242 PHARM REV CODE 250 ALT 637 W/ HCPCS: Performed by: SURGERY

## 2018-01-10 PROCEDURE — 85025 COMPLETE CBC W/AUTO DIFF WBC: CPT

## 2018-01-10 PROCEDURE — 85730 THROMBOPLASTIN TIME PARTIAL: CPT

## 2018-01-10 PROCEDURE — 25000242 PHARM REV CODE 250 ALT 637 W/ HCPCS: Performed by: GENERAL PRACTICE

## 2018-01-10 PROCEDURE — 25000003 PHARM REV CODE 250: Performed by: SURGERY

## 2018-01-10 PROCEDURE — 94664 DEMO&/EVAL PT USE INHALER: CPT

## 2018-01-10 PROCEDURE — 83735 ASSAY OF MAGNESIUM: CPT | Mod: 91

## 2018-01-10 PROCEDURE — 84100 ASSAY OF PHOSPHORUS: CPT

## 2018-01-10 PROCEDURE — S4991 NICOTINE PATCH NONLEGEND: HCPCS | Performed by: SURGERY

## 2018-01-10 PROCEDURE — 85610 PROTHROMBIN TIME: CPT

## 2018-01-10 PROCEDURE — 94640 AIRWAY INHALATION TREATMENT: CPT

## 2018-01-10 RX ORDER — CEFEPIME HYDROCHLORIDE 1 G/1
1 INJECTION, POWDER, FOR SOLUTION INTRAMUSCULAR; INTRAVENOUS
Status: DISCONTINUED | OUTPATIENT
Start: 2018-01-10 | End: 2018-01-13

## 2018-01-10 RX ORDER — FUROSEMIDE 10 MG/ML
40 INJECTION INTRAMUSCULAR; INTRAVENOUS ONCE
Status: COMPLETED | OUTPATIENT
Start: 2018-01-10 | End: 2018-01-10

## 2018-01-10 RX ORDER — BISACODYL 10 MG
10 SUPPOSITORY, RECTAL RECTAL ONCE
Status: DISCONTINUED | OUTPATIENT
Start: 2018-01-10 | End: 2018-01-10

## 2018-01-10 RX ORDER — SODIUM CHLORIDE FOR INHALATION 3 %
4 VIAL, NEBULIZER (ML) INHALATION
Status: DISCONTINUED | OUTPATIENT
Start: 2018-01-10 | End: 2018-01-12

## 2018-01-10 RX ORDER — SODIUM CHLORIDE FOR INHALATION 3 %
4 VIAL, NEBULIZER (ML) INHALATION EVERY 4 HOURS
Status: DISCONTINUED | OUTPATIENT
Start: 2018-01-10 | End: 2018-01-10

## 2018-01-10 RX ORDER — HYDROMORPHONE HYDROCHLORIDE 1 MG/ML
0.2 INJECTION, SOLUTION INTRAMUSCULAR; INTRAVENOUS; SUBCUTANEOUS EVERY 6 HOURS PRN
Status: DISCONTINUED | OUTPATIENT
Start: 2018-01-10 | End: 2018-01-10

## 2018-01-10 RX ORDER — ACETAMINOPHEN 325 MG/1
650 TABLET ORAL EVERY 6 HOURS
Status: DISCONTINUED | OUTPATIENT
Start: 2018-01-10 | End: 2018-01-13 | Stop reason: HOSPADM

## 2018-01-10 RX ORDER — BISACODYL 10 MG
10 SUPPOSITORY, RECTAL RECTAL DAILY
Status: DISCONTINUED | OUTPATIENT
Start: 2018-01-10 | End: 2018-01-12

## 2018-01-10 RX ORDER — LABETALOL HYDROCHLORIDE 5 MG/ML
10 INJECTION, SOLUTION INTRAVENOUS ONCE
Status: COMPLETED | OUTPATIENT
Start: 2018-01-10 | End: 2018-01-10

## 2018-01-10 RX ORDER — HYDROMORPHONE HYDROCHLORIDE 2 MG/ML
0.2 INJECTION, SOLUTION INTRAMUSCULAR; INTRAVENOUS; SUBCUTANEOUS EVERY 6 HOURS PRN
Status: DISCONTINUED | OUTPATIENT
Start: 2018-01-10 | End: 2018-01-11

## 2018-01-10 RX ADMIN — ACETAMINOPHEN 1000 MG: 10 INJECTION, SOLUTION INTRAVENOUS at 05:01

## 2018-01-10 RX ADMIN — MUPIROCIN 1 G: 20 OINTMENT TOPICAL at 08:01

## 2018-01-10 RX ADMIN — CEFEPIME 1 G: 1 INJECTION, POWDER, FOR SOLUTION INTRAMUSCULAR; INTRAVENOUS at 08:01

## 2018-01-10 RX ADMIN — LABETALOL HYDROCHLORIDE 10 MG: 5 INJECTION, SOLUTION INTRAVENOUS at 01:01

## 2018-01-10 RX ADMIN — IPRATROPIUM BROMIDE AND ALBUTEROL SULFATE 3 ML: .5; 3 SOLUTION RESPIRATORY (INHALATION) at 01:01

## 2018-01-10 RX ADMIN — FUROSEMIDE 40 MG: 10 INJECTION, SOLUTION INTRAMUSCULAR; INTRAVENOUS at 08:01

## 2018-01-10 RX ADMIN — HEPARIN SODIUM 5000 UNITS: 5000 INJECTION, SOLUTION INTRAVENOUS; SUBCUTANEOUS at 08:01

## 2018-01-10 RX ADMIN — LABETALOL HYDROCHLORIDE 10 MG: 5 INJECTION, SOLUTION INTRAVENOUS at 08:01

## 2018-01-10 RX ADMIN — IPRATROPIUM BROMIDE AND ALBUTEROL SULFATE 3 ML: .5; 3 SOLUTION RESPIRATORY (INHALATION) at 07:01

## 2018-01-10 RX ADMIN — NICOTINE 1 PATCH: 14 PATCH, EXTENDED RELEASE TRANSDERMAL at 08:01

## 2018-01-10 RX ADMIN — Medication: at 08:01

## 2018-01-10 RX ADMIN — CEFEPIME 1 G: 1 INJECTION, POWDER, FOR SOLUTION INTRAMUSCULAR; INTRAVENOUS at 04:01

## 2018-01-10 RX ADMIN — SODIUM CHLORIDE SOLN NEBU 3% 4 ML: 3 NEBU SOLN at 07:01

## 2018-01-10 RX ADMIN — LABETALOL HYDROCHLORIDE 10 MG: 5 INJECTION, SOLUTION INTRAVENOUS at 04:01

## 2018-01-10 RX ADMIN — HYDROMORPHONE HYDROCHLORIDE 0.2 MG: 2 INJECTION INTRAMUSCULAR; INTRAVENOUS; SUBCUTANEOUS at 06:01

## 2018-01-10 RX ADMIN — BISACODYL 10 MG: 10 SUPPOSITORY RECTAL at 08:01

## 2018-01-10 RX ADMIN — SODIUM CHLORIDE, SODIUM LACTATE, POTASSIUM CHLORIDE, AND CALCIUM CHLORIDE: 600; 310; 30; 20 INJECTION, SOLUTION INTRAVENOUS at 04:01

## 2018-01-10 RX ADMIN — ACETAMINOPHEN 650 MG: 325 TABLET ORAL at 11:01

## 2018-01-10 RX ADMIN — LABETALOL HYDROCHLORIDE 10 MG: 5 INJECTION, SOLUTION INTRAVENOUS at 11:01

## 2018-01-10 RX ADMIN — SODIUM CHLORIDE SOLN NEBU 3% 4 ML: 3 NEBU SOLN at 01:01

## 2018-01-10 NOTE — ASSESSMENT & PLAN NOTE
56 yo male with hx of AAA s/p open AAA repair     Neuro:  - PCA for pain; alert and oriented    Cardio:  - Normotensive; prn antihypertensive medications  -Continue arterial line    Resp:   - CXR showing RUL consolidation  - aggressive pulm toilet - IS, CPT   - on 9L NC. Wean NC as tolerated    FENGI:  - NPO. Lozenges and hard candy/gum okay ONLY  - suppositories daily      Renal:  - KVO IVF; creatinine stable 1.7, continue cruz    Heme/ID:  - H/H stable  - no leukocytosis, afebrile  - on subq heparin      DVT/ulcer ppx     Dispo: continue ICU for poor pulmonary toilet; can possibly step down to progressive this afternoon if improving.

## 2018-01-10 NOTE — PHYSICIAN QUERY
PT Name: Georgi Jo  MR #: [id number]     Physician Query Form - Documentation Clarification      CDS/: Yue Malik RN, CCDS              Contact information: [email or web address]    This form is a permanent document in the medical record.     Query Date: January 10, 2018    By submitting this query, we are merely seeking further clarification of documentation. Please utilize your independent clinical judgment when addressing the question(s) below.    The Medical record reflects the following:    Supporting Clinical Findings Location in Medical Record     KRISTIAN after 30 min clamp time. Cr 1.7 today    KRISTIAN  Creatinine remains 1.7   BUN 20 from 22  Will ensure adequate fluid resuscitation   1/9 prog note       1/10 cc prog note      Cr 1.2->1.6->1.7->  gfr >60 ->46.8->43.5       1/8, 1/9, 1/10 lab                                                                            Doctor, Please specify diagnosis or diagnoses associated with above clinical findings.  Please clarify KRISTIAN.    Provider Use Only      (    )  KRISTIAN, acute kidney injury    (    )  KRISTIAN, acute kidney insufficiency    (  x  )  Other___AKI which IS EXPECTED in this case with the need for a supra-renal aortic clamp_______________                                                                                                                           [  ] Clinically undetermined

## 2018-01-10 NOTE — PLAN OF CARE
Problem: Physical Therapy Goal  Goal: Physical Therapy Goal  Goals to be met by: 18    Patient will increase functional independence with mobility by performin. Supine to sit with MInimal Assistance - not met  2. Sit to stand transfer with Contact Guard Assistance -not met  3. Gait  x 220 feet with Supervision using AD if needed.- not met  4. Ascend/descend 4 stair with right Handrails Contact Guard Assistance -not met    eval completed and goals appropriate. Erica Belcher, PT 1/10/2018

## 2018-01-10 NOTE — PROGRESS NOTES
[facility]  Vascular Surgery  Progress Note    Patient Name: Georgi Jo  MRN: [id number]  Admission Date: 1/8/2018  Primary Care Provider: Primary Doctor No    Subjective:     Interval History: worsening oxygenation overnight; no bowel function return as of yet    Post-Op Info:  Procedure(s) (LRB):  Open Repair-Aneurysm-Abdominal-Aortic (Aaa)- Graph placement (N/A)   2 Days Post-Op       Medications:  Continuous Infusions:   hydromorphone in 0.9 % NaCl 6 mg/30 ml       Scheduled Meds:   albuterol-ipratropium 2.5mg-0.5mg/3mL  3 mL Nebulization Q6H WAKE    bisacodyl  10 mg Rectal Daily    ceFEPime (MAXIPIME) IVPB  1 g Intravenous Q8H    heparin (porcine)  5,000 Units Subcutaneous Q12H    mupirocin  1 g Nasal BID    nicotine  1 patch Transdermal Daily    sodium chloride 3%  4 mL Nebulization Q6H WAKE     PRN Meds:labetalol, naloxone     Objective:     Vital Signs (Most Recent):  Temp: 98.7 °F (37.1 °C) (01/10/18 1100)  Pulse: 91 (01/10/18 1145)  Resp: (!) 27 (01/10/18 1145)  BP: 135/80 (01/10/18 1100)  SpO2: 99 % (01/10/18 1200) Vital Signs (24h Range):  Temp:  [97.8 °F (36.6 °C)-99.5 °F (37.5 °C)] 98.7 °F (37.1 °C)  Pulse:  [] 91  Resp:  [1-29] 27  SpO2:  [86 %-100 %] 99 %  BP: (111-158)/(63-89) 135/80  Arterial Line BP: (112-171)/(51-84) 134/64       Date 01/10/18 0700 - 01/11/18 0659   Shift 8556-9374 9474-4297 6145-1420 24 Hour Total   I  N  T  A  K  E   Shift Total  (mL/kg)       O  U  T  P  U  T   Urine  (mL/kg/hr) 1640   1640    Shift Total  (mL/kg) 1640  (14.3)   1640  (14.3)   Weight (kg) 114.7 114.7 114.7 114.7       Physical Exam   Constitutional: He is oriented to person, place, and time. He appears well-developed and well-nourished.   Cardiovascular: Normal rate and regular rhythm.    Pulses:       Dorsalis pedis pulses are 1+ on the right side, and 1+ on the left side.   Pulmonary/Chest: Effort normal.   Abdominal: Soft. He exhibits distension. There is tenderness.    Musculoskeletal: Normal range of motion. He exhibits no edema.   Neurological: He is alert and oriented to person, place, and time.   Skin: Skin is warm and dry.       Significant Labs:  CBC:   Recent Labs  Lab 01/10/18  0256   WBC 14.26*   RBC 3.84*   HGB 11.4*   HCT 35.0*   *   MCV 91   MCH 29.7   MCHC 32.6     CMP:   Recent Labs  Lab 01/10/18  0256      CALCIUM 8.1*   ALBUMIN 2.3*   PROT 5.4*      K 4.3   CO2 23      BUN 20   CREATININE 1.7*   ALKPHOS 39*   ALT 11   AST 21   BILITOT 1.7*     Coagulation:   Recent Labs  Lab 01/10/18  0256   LABPROT 11.6   INR 1.1   APTT 24.9       Significant Diagnostics:      Assessment/Plan:     * AAA (abdominal aortic aneurysm)    58 yo male with hx of AAA s/p open AAA repair     Neuro:  - PCA for pain; alert and oriented    Cardio:  - Normotensive; prn antihypertensive medications  -Continue arterial line    Resp:   - CXR showing RUL consolidation  - aggressive pulm toilet - IS, CPT   - on 9L NC. Wean NC as tolerated    FENGI:  - NPO. Lozenges and hard candy/gum okay ONLY  - suppositories daily      Renal:  - KVO IVF; creatinine stable 1.7, continue cruz    Heme/ID:  - H/H stable  - no leukocytosis, afebrile  - on subq heparin      DVT/ulcer ppx     Dispo: continue ICU for poor pulmonary toilet; can possibly step down to progressive this afternoon if improving.                 Shelley Lundy MD  Vascular Surgery  [facility]

## 2018-01-10 NOTE — SUBJECTIVE & OBJECTIVE
Medications:  Continuous Infusions:   hydromorphone in 0.9 % NaCl 6 mg/30 ml       Scheduled Meds:   albuterol-ipratropium 2.5mg-0.5mg/3mL  3 mL Nebulization Q6H WAKE    bisacodyl  10 mg Rectal Daily    ceFEPime (MAXIPIME) IVPB  1 g Intravenous Q8H    heparin (porcine)  5,000 Units Subcutaneous Q12H    mupirocin  1 g Nasal BID    nicotine  1 patch Transdermal Daily    sodium chloride 3%  4 mL Nebulization Q6H WAKE     PRN Meds:labetalol, naloxone     Objective:     Vital Signs (Most Recent):  Temp: 98.7 °F (37.1 °C) (01/10/18 1100)  Pulse: 91 (01/10/18 1145)  Resp: (!) 27 (01/10/18 1145)  BP: 135/80 (01/10/18 1100)  SpO2: 99 % (01/10/18 1200) Vital Signs (24h Range):  Temp:  [97.8 °F (36.6 °C)-99.5 °F (37.5 °C)] 98.7 °F (37.1 °C)  Pulse:  [] 91  Resp:  [1-29] 27  SpO2:  [86 %-100 %] 99 %  BP: (111-158)/(63-89) 135/80  Arterial Line BP: (112-171)/(51-84) 134/64       Date 01/10/18 0700 - 01/11/18 0659   Shift 7204-1007 5716-7335 3162-3247 24 Hour Total   I  N  T  A  K  E   Shift Total  (mL/kg)       O  U  T  P  U  T   Urine  (mL/kg/hr) 1640   1640    Shift Total  (mL/kg) 1640  (14.3)   1640  (14.3)   Weight (kg) 114.7 114.7 114.7 114.7       Physical Exam   Constitutional: He is oriented to person, place, and time. He appears well-developed and well-nourished.   Cardiovascular: Normal rate and regular rhythm.    Pulses:       Dorsalis pedis pulses are 1+ on the right side, and 1+ on the left side.   Pulmonary/Chest: Effort normal.   Abdominal: Soft. He exhibits distension. There is tenderness.   Musculoskeletal: Normal range of motion. He exhibits no edema.   Neurological: He is alert and oriented to person, place, and time.   Skin: Skin is warm and dry.       Significant Labs:  CBC:   Recent Labs  Lab 01/10/18  0256   WBC 14.26*   RBC 3.84*   HGB 11.4*   HCT 35.0*   *   MCV 91   MCH 29.7   MCHC 32.6     CMP:   Recent Labs  Lab 01/10/18  0256      CALCIUM 8.1*   ALBUMIN 2.3*   PROT 5.4*       K 4.3   CO2 23      BUN 20   CREATININE 1.7*   ALKPHOS 39*   ALT 11   AST 21   BILITOT 1.7*     Coagulation:   Recent Labs  Lab 01/10/18  0256   LABPROT 11.6   INR 1.1   APTT 24.9       Significant Diagnostics:

## 2018-01-10 NOTE — PHYSICIAN QUERY
"PT Name: Georgi Jo  MR #: [id number]    Physician Query Form - Heart  Condition Clarification     CDS/: Yue Malik RN, CCDS               Contact information: [email or web address]  This form is a permanent document in the medical record.     Query Date: January 10, 2018    By submitting this query, we are merely seeking further clarification of documentation. Please utilize your independent clinical judgment when addressing the question(s) below.    The medical record contains the following   Indicators     Supporting Clinical Findings Location in Medical Record    BNP     X EF Systolic dysfunction with EF 45%. 12/19 h/p    X Radiology findings Persistent low lung volumes with bilateral areas of consolidation.  No acute cardiothoracic disease evident. 1/10 cxr (3930)    1/10 cxr  (0649)    Echo Results      "Ascites" documented      "SOB" or "PEDROZA" documented      "Hypoxia" documented     X Heart Failure documented CHF (EF 45%),  1/8 anesthesia pre-op    "Edema" documented     X Diuretics/Meds Furosemide 40 mgIV x1  1/10 mar    Treatment:     X Other:  CAD and Ischemic cardiomyopathy ( ef 45%)  maintain euvolemia     on 9L HFNC 1/8 cc h/p      1/10 nurse note       Provider, please specify diagnosis or diagnoses associated with above clinical findings.                               [ x ] Chronic Systolic Heart Failure (EF < 40)*  [  ] Chronic Diastolic Heart Failure (EF > 40)*  [  ] Chronic Combined Systolic and Diastolic Heart Failure  [  ] Other Type of Heart Failure (please specify type): _________________________  [  ] Heart Failure Ruled Out  [  ] Other (please specify): ___________________________________  [  ] Clinically Undetermined            *American [employer] Association                                                                                                          Please document in your progress notes daily for the duration of treatment until resolved and include in your discharge " summary.

## 2018-01-10 NOTE — PLAN OF CARE
Problem: Patient Care Overview  Goal: Plan of Care Review  Outcome: Ongoing (interventions implemented as appropriate)  Patient and patient's family updated on plan of care.

## 2018-01-10 NOTE — PROGRESS NOTES
Georgi Jo  01/10/2018    HPI:  Patient is a 57 y.o. 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 He had supra-renal clamp in place for approximately 29 minutes. He was successfully extubated at the completion of the procedure.     Hospital Course:  1/9/18: interval improvement in pulmonary atelectasis. Patient not passing flatus or having BM    Interval history  Endorses some ongoing abdominal pain. He reports it is painful to cough. States that he has been working hard doing incentive spirometry. Interval worsening on CXR, elevated white count.    Past Medical History:   Diagnosis Date    Hyperlipidemia     Myocardial infarction      Past Surgical History:   Procedure Laterality Date    CORONARY ANGIOPLASTY WITH STENT PLACEMENT  2007    X3    EYE SURGERY Right 2002     History reviewed. No pertinent family history.  Social History     Social History    Marital status:      Spouse name: N/A    Number of children: N/A    Years of education: N/A     Occupational History    Not on file.     Social History Main Topics    Smoking status: Current Every Day Smoker     Packs/day: 0.50     Years: 40.00    Smokeless tobacco: Never Used    Alcohol use Yes      Comment: rarely    Drug use: No    Sexual activity: Not on file     Other Topics Concern    Not on file     Social History Narrative    No narrative on file     No current facility-administered medications on file prior to encounter.      Current Outpatient Prescriptions on File Prior to Encounter   Medication Sig    atorvastatin (LIPITOR) 40 MG tablet Take 1 tablet (40 mg total) by mouth once daily.    aspirin 81 MG Chew Take 1 tablet (81 mg total) by mouth once daily.       REVIEW OF  SYSTEMS:  General: negative; ENT: negative; Allergy and Immunology: negative; Hematological and Lymphatic: Negative; Endocrine: negative; Respiratory: no cough, shortness of breath, or wheezing; Cardiovascular: no chest pain or dyspnea on exertion; Gastrointestinal: post operative abdominal pain, deneis bloody stools; Genito-Urinary: no dysuria, trouble voiding, or hematuria; Musculoskeletal: negative  Neurological: no TIA or stroke symptoms     PHYSICAL EXAM:      Pulse: 85  Temp: 98.4 °F (36.9 °C)      General appearance:  Alert, well-appearing, and in no distress.   Neurological: Normal speech, no focal findings noted; CN II - XII grossly intact           Musculoskeletal: Digits/nail without cyanosis/clubbing.  Normal muscle strength/tone                Chest:  Coarse breath sounds in right upper lobe     No use of accessory muscles             Cardiac: Normal rate and regular rhythm, S1 and S2 normal; PMI non-displaced          Abdomen: tender and distended - stable from 1/9, no masses or organomegaly      Extremities: + pedal pulses palpable.     No pedal edema       LAB RESULTS:  Lab Results   Component Value Date    K 4.3 01/10/2018    K 4.6 01/09/2018    K 4.6 01/09/2018    CREATININE 1.7 (H) 01/10/2018    CREATININE 1.7 (H) 01/09/2018    CREATININE 1.7 (H) 01/09/2018     Lab Results   Component Value Date    WBC 14.26 (H) 01/10/2018    WBC 10.40 01/09/2018    WBC 11.27 01/08/2018    HCT 35.0 (L) 01/10/2018    HCT 38.9 (L) 01/09/2018    HCT 39.9 (L) 01/08/2018     (L) 01/10/2018     01/09/2018     01/08/2018     No results found for: HGBA1C  IMAGING:    PLAN:  57 y.o. male with a history of CAD s/p stenting x3 in 2014, ischemic cardiomyopathy, HLD, prediabetes, AAA and right iliac artery aneurysms s/p repair 1/8/18.    Neuro:  Post Operative Pain:  - dilaudid PCA 0.1 mg q6 minutes max of 1 mg/hr    Pulm:  Tobacco Abuse:  - nicotine patch 14 mg/24hr  - duo-nebs scheduled q6 while awake  -  counseling given - encouraged cessation    Right upper lobe atelectasis/ Pneumonia:  - CPT ordered  - incentive spirometry  - encourage sigh breaths/ intermittent deep breathing  - cefepime 1g q8  - daily CXR    Cardiac:  Hypertension:  - labetalol 10 mg IV q4 PRN for SBP greater than 155    CAD and Ischemic Cardiomyopathy:  - Goal MAP of >65  - maintain euvolemia    Renal:  Velez in place  Monitor and trend UOP (1.8L/0.7 ml/kg/hr)    KRISTIAN:  Creatinine remains 1.7   BUN 20 from 22  Will ensure adequate fluid resuscitation    FENGI:  GI prophylaxis:  - famotidine 20 mg BID    Fluids:  nil    Electrolytes:  - replenish as needed  - daily CMP, mag, phos    Heme/Onc:  DVT prophylaxis: heparin 5000 units BID  Daily CBC, INR, PT, PTT    Infectious Disease:  -Cefazolin q8 for 2 doses postoperatively (completed)  - cefepime 1g q8 added for pneumonia    Disposition:  Plan for stepdown today    Rodrick Sanchez MD PGY2  Anesthesia/SICU

## 2018-01-10 NOTE — PLAN OF CARE
Problem: Patient Care Overview  Goal: Plan of Care Review  Outcome: Ongoing (interventions implemented as appropriate)  POC reviewed with patient. A&Ox4. Pt on 9L HFNC. MIVF infusing. Dilaudid PCA in use. Adequate UOP throughout shift. VSS. See flowsheet for details. All questions answered by RN. Will continue to monitor.

## 2018-01-10 NOTE — PROGRESS NOTES
Dr. Fleming notified of pt's morning labs. No new orders received at this time. Will continue to monitor.

## 2018-01-10 NOTE — PT/OT/SLP EVAL
Physical Therapy Evaluation    Patient Name:  Georgi Jo   MRN:  [id number]    Recommendations:     Discharge Recommendations:  home health PT   Discharge Equipment Recommendations:  (will determine DME needs closer to discharge)   Barriers to discharge: None    Assessment:     Georgi Jo is a 57 y.o. male admitted with a medical diagnosis of AAA (abdominal aortic aneurysm).  He presents with the following impairments/functional limitations:  impaired functional mobilty, gait instability, impaired balance, pain pt is s/p open para-renal AAA repair 1/8/18. Pt hayden treatment well and would benefit from skilled PT 5x/wk to return pt to Independent status. Pt will be able to discharge home with HHPT and DME to be determined closer to discharge. .    Rehab Prognosis:  good; patient would benefit from acute skilled PT services to address these deficits and reach maximum level of function.      Recent Surgery: Procedure(s) (LRB):  Open Repair-Aneurysm-Abdominal-Aortic (Aaa)- Graph placement (N/A) 2 Days Post-Op    Plan:     During this hospitalization, patient to be seen 5 x/week to address the above listed problems via gait training, therapeutic activities  · Plan of Care Expires:  02/07/18   Plan of Care Reviewed with: patient, spouse    Subjective     Communicated with nurse prior to session.  Patient found sitting in chair upon PT entry to room, agreeable to evaluation.      Chief Complaint: pt c/o pain in abdomen  Patient comments/goals: to get better and go home.   Pain/Comfort:  · Pain Rating 1: 5/10  · Location 1:  (abdomen)  · Pain Rating Post-Intervention 1: 5/10    Patients cultural, spiritual, Congregational conflicts given the current situation: none    Living Environment:  Pt is retired and lives with his homemaker wife who will be caregiver. Pt lives in  Mobile home with 4 steps and 1 handrail.  Prior to admission, patients level of function was Independent.  Patient has the following equipment: none.  DME  owned (not currently used): none.  Upon discharge, patient will have assistance from wife.    Objective:     Patient found with: arterial line, telemetry, blood pressure cuff, pulse ox (continuous), central line, oxygen, cruz catheter, PCA     General Precautions: Standard, fall   Orthopedic Precautions:    Braces:       Exams:  · Cognitive Exam:  Patient is oriented to Person, Place, Time and Situation  ·   · RLE ROM: WFL  · RLE Strength: WFL  · LLE ROM: WFL  · LLE Strength: WFL    Functional Mobility:  · Transfers:     · Sit to Stand:  minimum assistance with no AD  ·   · Gait: 20 ft with CGA and O2 intact. pt stood at sink with CGA to brush teeth.     AM-PAC 6 CLICK MOBILITY  Total Score:16         Patient left up in chair with all lines intact, call button in reach and wife present.    GOALS:    Physical Therapy Goals        Problem: Physical Therapy Goal    Goal Priority Disciplines Outcome Goal Variances Interventions   Physical Therapy Goal     PT/OT, PT      Description:  Goals to be met by: 18    Patient will increase functional independence with mobility by performin. Supine to sit with MInimal Assistance - not met  2. Sit to stand transfer with Contact Guard Assistance -not met  3. Gait  x 220 feet with Supervision using AD if needed.- not met  4. Ascend/descend 4 stair with right Handrails Contact Guard Assistance -not met                      History:     Past Medical History:   Diagnosis Date    Hyperlipidemia     Myocardial infarction        Past Surgical History:   Procedure Laterality Date    CORONARY ANGIOPLASTY WITH STENT PLACEMENT  2007    X3    EYE SURGERY Right 2002       Clinical Decision Making:     History  Co-morbidities and personal factors that may impact the plan of care Examination  Body Structures and Functions, activity limitations and participation restrictions that may impact the plan of care Clinical Presentation   Decision Making/ Complexity Score    Co-morbidities:   [] Time since onset of injury / illness / exacerbation  [] Status of current condition  []Patient's cognitive status and safety concerns    [] Multiple Medical Problems (see med hx)  Personal Factors:   [] Patient's age  [] Prior Level of function   [] Patient's home situation (environment and family support)  [] Patient's level of motivation  [] Expected progression of patient      HISTORY:(criteria)    [] 52236 - no personal factors/history    [] 44954 - has 1-2 personal factor/comorbidity     [] 45808 - has >3 personal factor/comorbidity     Body Regions:  [] Objective examination findings  [] Head     []  Neck  [] Trunk   [] Upper Extremity  [] Lower Extremity    Body Systems:  [] For communication ability, affect, cognition, language, and learning style: the assessment of the ability to make needs known, consciousness, orientation (person, place, and time), expected emotional /behavioral responses, and learning preferences (eg, learning barriers, education  needs)  [] For the neuromuscular system: a general assessment of gross coordinated movement (eg, balance, gait, locomotion, transfers, and transitions) and motor function  (motor control and motor learning)  [] For the musculoskeletal system: the assessment of gross symmetry, gross range of motion, gross strength, height, and weight  [] For the integumentary system: the assessment of pliability(texture), presence of scar formation, skin color, and skin integrity  [] For cardiovascular/pulmonary system: the assessment of heart rate, respiratory rate, blood pressure, and edema     Activity limitations:    [] Patient's cognitive status and saf ety concerns          [] Status of current condition      [] Weight bearing restriction  [] Cardiopulmunary Restriction    Participation Restrictions:   [] Goals and goal agreement with the patient     [] Rehab potential (prognosis) and probable outcome      Examination of Body System: (criteria)    []  24856 - addressing 1-2 elements    [] 34631 - addressing a total of 3 or more elements     [] 03641 -  Addressing a total of 4 or more elements         Clinical Presentation: (criteria)  Choose one     On examination of body system using standardized tests and measures patient presents with (CHOOSE ONE) elements from any of the following: body structures and functions, activity limitations, and/or participation restrictions.  Leading to a clinical presentation that is considered (CHOOSE ONE)                              Clinical Decision Making  (Eval Complexity):  Choose One     Time Tracking:     PT Received On: 01/10/18  PT Start Time: 0854     PT Stop Time: 0914  PT Total Time (min): 20 min     Billable Minutes: Evaluation 10 min  and Gait Training 10 min      Erica Belcher, PT  01/10/2018

## 2018-01-10 NOTE — ANESTHESIA POSTPROCEDURE EVALUATION
Anesthesia Post Evaluation    Patient: Georgi Jo    Procedure(s) Performed: Procedure(s) (LRB):  Open Repair-Aneurysm-Abdominal-Aortic (Aaa)- Graph placement (N/A)    Final Anesthesia Type: general  Patient location during evaluation: ICU  Patient participation: Yes- Able to Participate  Level of consciousness: awake and alert and oriented  Post-procedure vital signs: reviewed and stable  Pain management: adequate  Airway patency: patent  PONV status at discharge: No PONV  Anesthetic complications: no      Cardiovascular status: blood pressure returned to baseline and hemodynamically stable  Respiratory status: unassisted and spontaneous ventilation  Hydration status: euvolemic  Follow-up not needed.        Visit Vitals  /63 (BP Location: Left arm, Patient Position: Lying)   Pulse 81   Temp 36.6 °C (97.8 °F) (Oral)   Resp (!) 21   Ht 6' (1.829 m)   Wt 114.7 kg (252 lb 13.9 oz)   SpO2 (!) 94%   BMI 34.29 kg/m²       Pain/Perez Score: Pain Assessment Performed: Yes (1/10/2018  3:05 AM)  Presence of Pain: denies (1/10/2018  3:05 AM)  Pain Rating Prior to Med Admin: 8 (1/10/2018  5:03 AM)

## 2018-01-11 PROBLEM — J42 CHRONIC BRONCHITIS: Status: ACTIVE | Noted: 2018-01-11

## 2018-01-11 LAB
ALBUMIN SERPL BCP-MCNC: 2.2 G/DL
ALP SERPL-CCNC: 44 U/L
ALT SERPL W/O P-5'-P-CCNC: 8 U/L
ANION GAP SERPL CALC-SCNC: 10 MMOL/L
AST SERPL-CCNC: 20 U/L
BASOPHILS # BLD AUTO: 0.03 K/UL
BASOPHILS NFR BLD: 0.3 %
BILIRUB SERPL-MCNC: 1.1 MG/DL
BUN SERPL-MCNC: 24 MG/DL
CALCIUM SERPL-MCNC: 8.2 MG/DL
CHLORIDE SERPL-SCNC: 104 MMOL/L
CO2 SERPL-SCNC: 24 MMOL/L
CREAT SERPL-MCNC: 1.6 MG/DL
DIFFERENTIAL METHOD: ABNORMAL
EOSINOPHIL # BLD AUTO: 0.2 K/UL
EOSINOPHIL NFR BLD: 1.5 %
ERYTHROCYTE [DISTWIDTH] IN BLOOD BY AUTOMATED COUNT: 13.7 %
EST. GFR  (AFRICAN AMERICAN): 54.1 ML/MIN/1.73 M^2
EST. GFR  (NON AFRICAN AMERICAN): 46.8 ML/MIN/1.73 M^2
GLUCOSE SERPL-MCNC: 98 MG/DL
HCT VFR BLD AUTO: 31.1 %
HGB BLD-MCNC: 10.1 G/DL
IMM GRANULOCYTES # BLD AUTO: 0.08 K/UL
IMM GRANULOCYTES NFR BLD AUTO: 0.8 %
INR PPP: 1
LYMPHOCYTES # BLD AUTO: 1.4 K/UL
LYMPHOCYTES NFR BLD: 13.1 %
MAGNESIUM SERPL-MCNC: 2 MG/DL
MCH RBC QN AUTO: 29.5 PG
MCHC RBC AUTO-ENTMCNC: 32.5 G/DL
MCV RBC AUTO: 91 FL
MONOCYTES # BLD AUTO: 0.9 K/UL
MONOCYTES NFR BLD: 8.5 %
NEUTROPHILS # BLD AUTO: 8.1 K/UL
NEUTROPHILS NFR BLD: 75.8 %
NRBC BLD-RTO: 0 /100 WBC
PHOSPHATE SERPL-MCNC: 2.8 MG/DL
PLATELET # BLD AUTO: 134 K/UL
PMV BLD AUTO: 10.5 FL
POTASSIUM SERPL-SCNC: 4.3 MMOL/L
PROT SERPL-MCNC: 5.8 G/DL
PROTHROMBIN TIME: 10.6 SEC
RBC # BLD AUTO: 3.42 M/UL
SODIUM SERPL-SCNC: 138 MMOL/L
WBC # BLD AUTO: 10.63 K/UL

## 2018-01-11 PROCEDURE — 97165 OT EVAL LOW COMPLEX 30 MIN: CPT

## 2018-01-11 PROCEDURE — 25000003 PHARM REV CODE 250: Performed by: STUDENT IN AN ORGANIZED HEALTH CARE EDUCATION/TRAINING PROGRAM

## 2018-01-11 PROCEDURE — 94761 N-INVAS EAR/PLS OXIMETRY MLT: CPT

## 2018-01-11 PROCEDURE — 94799 UNLISTED PULMONARY SVC/PX: CPT

## 2018-01-11 PROCEDURE — 20000000 HC ICU ROOM

## 2018-01-11 PROCEDURE — 25000242 PHARM REV CODE 250 ALT 637 W/ HCPCS: Performed by: GENERAL PRACTICE

## 2018-01-11 PROCEDURE — 80053 COMPREHEN METABOLIC PANEL: CPT

## 2018-01-11 PROCEDURE — 27000221 HC OXYGEN, UP TO 24 HOURS

## 2018-01-11 PROCEDURE — 63600175 PHARM REV CODE 636 W HCPCS: Performed by: STUDENT IN AN ORGANIZED HEALTH CARE EDUCATION/TRAINING PROGRAM

## 2018-01-11 PROCEDURE — 85610 PROTHROMBIN TIME: CPT

## 2018-01-11 PROCEDURE — 63600175 PHARM REV CODE 636 W HCPCS: Performed by: SURGERY

## 2018-01-11 PROCEDURE — 94664 DEMO&/EVAL PT USE INHALER: CPT

## 2018-01-11 PROCEDURE — 85025 COMPLETE CBC W/AUTO DIFF WBC: CPT

## 2018-01-11 PROCEDURE — 99233 SBSQ HOSP IP/OBS HIGH 50: CPT | Mod: ,,, | Performed by: SURGERY

## 2018-01-11 PROCEDURE — 83735 ASSAY OF MAGNESIUM: CPT

## 2018-01-11 PROCEDURE — 25000242 PHARM REV CODE 250 ALT 637 W/ HCPCS: Performed by: SURGERY

## 2018-01-11 PROCEDURE — 94640 AIRWAY INHALATION TREATMENT: CPT

## 2018-01-11 PROCEDURE — 84100 ASSAY OF PHOSPHORUS: CPT

## 2018-01-11 PROCEDURE — 97530 THERAPEUTIC ACTIVITIES: CPT

## 2018-01-11 PROCEDURE — 99900035 HC TECH TIME PER 15 MIN (STAT)

## 2018-01-11 RX ORDER — ONDANSETRON 2 MG/ML
4 INJECTION INTRAMUSCULAR; INTRAVENOUS ONCE
Status: COMPLETED | OUTPATIENT
Start: 2018-01-11 | End: 2018-01-11

## 2018-01-11 RX ORDER — ATORVASTATIN CALCIUM 20 MG/1
40 TABLET, FILM COATED ORAL DAILY
Status: DISCONTINUED | OUTPATIENT
Start: 2018-01-12 | End: 2018-01-13 | Stop reason: HOSPADM

## 2018-01-11 RX ORDER — ASPIRIN 81 MG/1
81 TABLET ORAL DAILY
Status: DISCONTINUED | OUTPATIENT
Start: 2018-01-12 | End: 2018-01-13 | Stop reason: HOSPADM

## 2018-01-11 RX ORDER — AMOXICILLIN 250 MG
1 CAPSULE ORAL 2 TIMES DAILY
Status: DISCONTINUED | OUTPATIENT
Start: 2018-01-11 | End: 2018-01-13 | Stop reason: HOSPADM

## 2018-01-11 RX ORDER — OXYCODONE HYDROCHLORIDE 5 MG/1
5 TABLET ORAL EVERY 6 HOURS PRN
Status: DISCONTINUED | OUTPATIENT
Start: 2018-01-11 | End: 2018-01-13 | Stop reason: HOSPADM

## 2018-01-11 RX ADMIN — SODIUM CHLORIDE SOLN NEBU 3% 4 ML: 3 NEBU SOLN at 08:01

## 2018-01-11 RX ADMIN — HEPARIN SODIUM 5000 UNITS: 5000 INJECTION, SOLUTION INTRAVENOUS; SUBCUTANEOUS at 09:01

## 2018-01-11 RX ADMIN — CEFEPIME 1 G: 1 INJECTION, POWDER, FOR SOLUTION INTRAMUSCULAR; INTRAVENOUS at 09:01

## 2018-01-11 RX ADMIN — ACETAMINOPHEN 650 MG: 325 TABLET ORAL at 12:01

## 2018-01-11 RX ADMIN — CEFEPIME 1 G: 1 INJECTION, POWDER, FOR SOLUTION INTRAMUSCULAR; INTRAVENOUS at 06:01

## 2018-01-11 RX ADMIN — IPRATROPIUM BROMIDE AND ALBUTEROL SULFATE 3 ML: .5; 3 SOLUTION RESPIRATORY (INHALATION) at 08:01

## 2018-01-11 RX ADMIN — STANDARDIZED SENNA CONCENTRATE AND DOCUSATE SODIUM 1 TABLET: 8.6; 5 TABLET, FILM COATED ORAL at 08:01

## 2018-01-11 RX ADMIN — HYDROMORPHONE HYDROCHLORIDE 0.2 MG: 2 INJECTION INTRAMUSCULAR; INTRAVENOUS; SUBCUTANEOUS at 01:01

## 2018-01-11 RX ADMIN — HEPARIN SODIUM 5000 UNITS: 5000 INJECTION, SOLUTION INTRAVENOUS; SUBCUTANEOUS at 08:01

## 2018-01-11 RX ADMIN — ACETAMINOPHEN 650 MG: 325 TABLET ORAL at 11:01

## 2018-01-11 RX ADMIN — SODIUM CHLORIDE SOLN NEBU 3% 4 ML: 3 NEBU SOLN at 02:01

## 2018-01-11 RX ADMIN — ONDANSETRON 4 MG: 2 INJECTION INTRAMUSCULAR; INTRAVENOUS at 10:01

## 2018-01-11 RX ADMIN — CEFEPIME 1 G: 1 INJECTION, POWDER, FOR SOLUTION INTRAMUSCULAR; INTRAVENOUS at 01:01

## 2018-01-11 RX ADMIN — MUPIROCIN 1 G: 20 OINTMENT TOPICAL at 09:01

## 2018-01-11 RX ADMIN — ACETAMINOPHEN 650 MG: 325 TABLET ORAL at 06:01

## 2018-01-11 RX ADMIN — IPRATROPIUM BROMIDE AND ALBUTEROL SULFATE 3 ML: .5; 3 SOLUTION RESPIRATORY (INHALATION) at 02:01

## 2018-01-11 RX ADMIN — HYDROMORPHONE HYDROCHLORIDE 0.2 MG: 2 INJECTION INTRAMUSCULAR; INTRAVENOUS; SUBCUTANEOUS at 02:01

## 2018-01-11 RX ADMIN — OXYCODONE HYDROCHLORIDE 5 MG: 5 TABLET ORAL at 07:01

## 2018-01-11 RX ADMIN — MUPIROCIN 1 G: 20 OINTMENT TOPICAL at 08:01

## 2018-01-11 RX ADMIN — HYDROMORPHONE HYDROCHLORIDE 2 MG: 2 INJECTION INTRAMUSCULAR; INTRAVENOUS; SUBCUTANEOUS at 09:01

## 2018-01-11 NOTE — PROGRESS NOTES
[facility]  Vascular Surgery  Progress Note    Patient Name: Georgi Jo  MRN: [id number]  Admission Date: 1/8/2018  Primary Care Provider: Primary Doctor No    Subjective:     Interval History: VSS. Weaned to 5L HF. Much improved breathing. 3 Bms yesterday, requesting liquids.     Post-Op Info:  Procedure(s) (LRB):  Open Repair-Aneurysm-Abdominal-Aortic (Aaa)- Graph placement (N/A)   3 Days Post-Op       Medications:  Continuous Infusions:  Scheduled Meds:   acetaminophen  650 mg Oral Q6H    albuterol-ipratropium 2.5mg-0.5mg/3mL  3 mL Nebulization Q6H WAKE    [START ON 1/12/2018] aspirin  81 mg Oral Daily    [START ON 1/12/2018] atorvastatin  40 mg Oral Daily    bisacodyl  10 mg Rectal Daily    ceFEPime (MAXIPIME) IVPB  1 g Intravenous Q8H    heparin (porcine)  5,000 Units Subcutaneous Q12H    mupirocin  1 g Nasal BID    senna-docusate 8.6-50 mg  1 tablet Oral BID    sodium chloride 3%  4 mL Nebulization Q6H WAKE     PRN Meds:labetalol, oxyCODONE     Objective:     Vital Signs (Most Recent):  Temp: 98.3 °F (36.8 °C) (01/11/18 1600)  Pulse: 83 (01/11/18 1630)  Resp: (!) 25 (01/11/18 1615)  BP: 136/75 (01/11/18 1630)  SpO2: 96 % (01/11/18 1630) Vital Signs (24h Range):  Temp:  [97.7 °F (36.5 °C)-99 °F (37.2 °C)] 98.3 °F (36.8 °C)  Pulse:  [77-93] 83  Resp:  [0-84] 25  SpO2:  [84 %-100 %] 96 %  BP: (118-173)/(65-89) 136/75  Arterial Line BP: ()/(55-89) 134/65       Date 01/11/18 0700 - 01/12/18 0659   Shift [id number] [id number] 3733-2010 24 Hour Total   I  N  T  A  K  E   P.O. 240   240    Shift Total  (mL/kg) 240  (2.2)   240  (2.2)   O  U  T  P  U  T   Shift Total  (mL/kg)       Weight (kg) 111.3 111.3 111.3 111.3       Physical Exam   Constitutional: He is oriented to person, place, and time. He appears well-developed and well-nourished.   Cardiovascular: Normal rate and regular rhythm.    Pulses:       Dorsalis pedis pulses are 2+ on the right side, and 2+ on the left side.         Posterior tibial pulses are 2+ on the right side, and 2+ on the left side.   Pulmonary/Chest: Effort normal.   Abdominal: Soft. He exhibits improved distension. There is tenderness. Midline incision with dressing in place  Groins are soft bilaterally  Musculoskeletal: Normal range of motion. He exhibits no edema.   Neurological: He is alert and oriented to person, place, and time.   Skin: Skin is warm and dry.       Significant Labs:  CBC:   Recent Labs  Lab 01/11/18  0324   WBC 10.63   RBC 3.42*   HGB 10.1*   HCT 31.1*   *   MCV 91   MCH 29.5   MCHC 32.5     CMP:   Recent Labs  Lab 01/11/18  0324   GLU 98   CALCIUM 8.2*   ALBUMIN 2.2*   PROT 5.8*      K 4.3   CO2 24      BUN 24*   CREATININE 1.6*   ALKPHOS 44*   ALT 8*   AST 20   BILITOT 1.1*       Significant Diagnostics:  I have reviewed all pertinent imaging results/findings within the past 24 hours.   CXR: Much improved suze RUL consolidation     Assessment/Plan:     * AAA (abdominal aortic aneurysm)    58 yo male with hx of AAA s/p open AAA repair, POD3    Neuro:  - oxy PRN for pain. PCA d/c    Cardio:  - Normotensive; prn antihypertensive medications  - d/c carlos enrique. D/c central line  - no pressors   - home asa and statin     Resp:   - CXR much improved this AM   - aggressive pulm toilet - IS, CPT, acapella   -weaned to 5L HF NC. Continue to wean as tolerated    FENGI:  -3 BMs.   - will start clears and progress to regular diet if tolerates  - suppositories daily      Renal:  - KVO IVF; creatinine stabilizing, now decreased to 1.6 from 1.7  - d/c cruz    Heme/ID:  - H/H downtrending. No transfusion needed. Will continue to monitor   - no leukocytosis, afebrile  - on subq heparin    OOBTC, ambulate, PT/OT    DVT/ulcer ppx     Dispo: stepdown to floor                 Jaime Juarez MD  Vascular Surgery  [facility]

## 2018-01-11 NOTE — PROGRESS NOTES
Dr. Ayala called notified of -170s. Labetalol not due again at this time. One time additional dose ordered. TORB. See orders for details.

## 2018-01-11 NOTE — PLAN OF CARE
Problem: Patient Care Overview  Goal: Individualization & Mutuality  PMH: CHF; EF 40%; CAD; current smoker    Dx: AAA repair     1/8/18: Extubated in OR; admitted to SICU; LR at 125ml.hr  1/9/18: OOB to chair         Nsg:   SBP <150  MAP > 60    Outcome: Ongoing (interventions implemented as appropriate)  Pt remains AAO x 4. High flow NC 5L. O2 sat >95%. No SOB noted. Afebrile. Bp stable. SBP <150 mmHg maintained c prn labetalol. Abd incision CDI. Staples in place, edges well approximated. Abd binder in place. Hypoactive bowel sounds c abd distension. 2 bms this shift. UO adequate. Ambulates well, steady gait. Up to chair for a couple of hours overnight. Pt updated on POC. See chart and doc flowsheet for details. Will continue to monitor.

## 2018-01-11 NOTE — ASSESSMENT & PLAN NOTE
56 yo male with hx of AAA s/p open AAA repair, POD3    Neuro:  - oxy PRN for pain. PCA d/c    Cardio:  - Normotensive; prn antihypertensive medications  - d/c carlos enrique. D/c central line  - no pressors   - home asa and statin     Resp:   - CXR much improved this AM   - aggressive pulm toilet - IS, CPT, acapella   -weaned to 5L HF NC. Continue to wean as tolerated    FENGI:  -3 BMs.   - will start clears and progress to regular diet if tolerates  - suppositories daily      Renal:  - KVO IVF; creatinine stabilizing, now decreased to 1.6 from 1.7  - d/c cruz    Heme/ID:  - H/H downtrending. No transfusion needed. Will continue to monitor   - no leukocytosis, afebrile  - on subq heparin    OOBTC, ambulate, PT/OT    DVT/ulcer ppx     Dispo: stepdown to floor

## 2018-01-11 NOTE — PLAN OF CARE
Therapy rec    and johnie Turk notified     01/11/18 1579   Right Care Assessment   Can the patient answer the patient profile reliably? Yes, cognitively intact   How often would a person be available to care for the patient? Whenever needed   Describe the patient's ability to walk at the present time. Walks with the help of equipment   How does the patient rate their overall health at the present time? Fair   Number of comorbid conditions (as recorded on the chart) Two   During the past month, has the patient often been bothered by feeling down, depressed or hopeless? Yes   Have you felt down, depressed, or hopeless? 0   During the past month, has the patient often been bothered by little interest or pleasure in doing things? No

## 2018-01-11 NOTE — PLAN OF CARE
Problem: Patient Care Overview  Goal: Plan of Care Review  Outcome: Ongoing (interventions implemented as appropriate)  Patient and patient's family members updated on plan of care. No acute events during shift. VSS. 1x dose of 10mg labetalol given for SBP > 155. Patient is currently on 5L High Flow nasal cannula and doing his IS q1 hour. 1x dose of 40mg Lasix given and patient responded appropriately. 1 BM today. Abdomen is distended, firm, and tender. Midline incision is clean and dry with staples intact. Abdominal binder intact. Patient is allowed to have small amount of ice chips per Dr. Lundy. All questions and concerns acknowledged and answered. See flow sheet for full assessment. Will continue to monitor closely.

## 2018-01-11 NOTE — SUBJECTIVE & OBJECTIVE
Medications:  Continuous Infusions:  Scheduled Meds:   acetaminophen  650 mg Oral Q6H    albuterol-ipratropium 2.5mg-0.5mg/3mL  3 mL Nebulization Q6H WAKE    [START ON 1/12/2018] aspirin  81 mg Oral Daily    [START ON 1/12/2018] atorvastatin  40 mg Oral Daily    bisacodyl  10 mg Rectal Daily    ceFEPime (MAXIPIME) IVPB  1 g Intravenous Q8H    heparin (porcine)  5,000 Units Subcutaneous Q12H    mupirocin  1 g Nasal BID    senna-docusate 8.6-50 mg  1 tablet Oral BID    sodium chloride 3%  4 mL Nebulization Q6H WAKE     PRN Meds:labetalol, oxyCODONE     Objective:     Vital Signs (Most Recent):  Temp: 98.3 °F (36.8 °C) (01/11/18 1600)  Pulse: 83 (01/11/18 1630)  Resp: (!) 25 (01/11/18 1615)  BP: 136/75 (01/11/18 1630)  SpO2: 96 % (01/11/18 1630) Vital Signs (24h Range):  Temp:  [97.7 °F (36.5 °C)-99 °F (37.2 °C)] 98.3 °F (36.8 °C)  Pulse:  [77-93] 83  Resp:  [0-84] 25  SpO2:  [84 %-100 %] 96 %  BP: (118-173)/(65-89) 136/75  Arterial Line BP: ()/(55-89) 134/65       Date 01/11/18 0700 - 01/12/18 0659   Shift [id number] [id number] 9183-7319 24 Hour Total   I  N  T  A  K  E   P.O. 240   240    Shift Total  (mL/kg) 240  (2.2)   240  (2.2)   O  U  T  P  U  T   Shift Total  (mL/kg)       Weight (kg) 111.3 111.3 111.3 111.3       Physical Exam   Constitutional: He is oriented to person, place, and time. He appears well-developed and well-nourished.   Cardiovascular: Normal rate and regular rhythm.    Pulses:       Dorsalis pedis pulses are 2+ on the right side, and 2+ on the left side.        Posterior tibial pulses are 2+ on the right side, and 2+ on the left side.   Pulmonary/Chest: Effort normal.   Abdominal: Soft. He exhibits distension. There is tenderness.   Musculoskeletal: Normal range of motion. He exhibits no edema.   Neurological: He is alert and oriented to person, place, and time.   Skin: Skin is warm and dry.       Significant Labs:  CBC:   Recent Labs  Lab 01/11/18  0324   WBC 10.63   RBC 3.42*    HGB 10.1*   HCT 31.1*   *   MCV 91   MCH 29.5   MCHC 32.5     CMP:   Recent Labs  Lab 01/11/18  0324   GLU 98   CALCIUM 8.2*   ALBUMIN 2.2*   PROT 5.8*      K 4.3   CO2 24      BUN 24*   CREATININE 1.6*   ALKPHOS 44*   ALT 8*   AST 20   BILITOT 1.1*       Significant Diagnostics:  I have reviewed all pertinent imaging results/findings within the past 24 hours.   CXR: Much improved suze RUL consolidation

## 2018-01-11 NOTE — PLAN OF CARE
Problem: Cardiac Surgery (Adult)  Intervention: Manage Post-operative Pain  Pt. C/o mod incisional pain, suze. Over sternum, sutures intact, incision c/d/i, no drainage noted, abd binder in place, pain relieved with Dilaudid IV, pt. Ambulating in hallway, + BS, has had BM in past 24 hrs, refuses suppository,  denies SOB or fatique, continue to increase activity, pain meds as ordered, IS and NC O2.

## 2018-01-11 NOTE — PROGRESS NOTES
Georgi Jo  01/11/2018    HPI:  Patient is a 57 y.o. 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 He had supra-renal clamp in place for approximately 29 minutes. He was successfully extubated at the completion of the procedure.     Hospital Course:  1/9/18: interval improvement in pulmonary atelectasis. Patient not passing flatus or having BM    1/10/18: interval worsening of right upper lobe opacity, cefepime started, aggressive CPT, IS, hypertonic saline nebulizer    Interval history  Improved abdominal discomfort this AM. O2 requirements improved, dyspnea improved. Discussed plan for stepdown today.    Past Medical History:   Diagnosis Date    Hyperlipidemia     Myocardial infarction      Past Surgical History:   Procedure Laterality Date    CORONARY ANGIOPLASTY WITH STENT PLACEMENT  2007    X3    EYE SURGERY Right 2002     History reviewed. No pertinent family history.  Social History     Social History    Marital status:      Spouse name: N/A    Number of children: N/A    Years of education: N/A     Occupational History    Not on file.     Social History Main Topics    Smoking status: Current Every Day Smoker     Packs/day: 0.50     Years: 40.00    Smokeless tobacco: Never Used    Alcohol use Yes      Comment: rarely    Drug use: No    Sexual activity: Not on file     Other Topics Concern    Not on file     Social History Narrative    No narrative on file     No current facility-administered medications on file prior to encounter.      Current Outpatient Prescriptions on File Prior to Encounter   Medication Sig    atorvastatin (LIPITOR) 40 MG tablet Take 1 tablet (40 mg total) by mouth once daily.    aspirin 81 MG Chew Take 1 tablet (81 mg  total) by mouth once daily.       REVIEW OF SYSTEMS:  General: negative; ENT: negative; Allergy and Immunology: negative; Hematological and Lymphatic: Negative; Endocrine: negative; Respiratory: no cough, shortness of breath, or wheezing; Cardiovascular: no chest pain or dyspnea on exertion; Gastrointestinal: post operative abdominal pain, deneis bloody stools; Genito-Urinary: no dysuria, trouble voiding, or hematuria; Musculoskeletal: negative  Neurological: no TIA or stroke symptoms     PHYSICAL EXAM:      Pulse: 79  Temp: 98.8 °F (37.1 °C)      General appearance:  Alert, well-appearing, and in no distress.   Neurological: Normal speech, no focal findings noted; CN II - XII grossly intact           Musculoskeletal: Digits/nail without cyanosis/clubbing.  Normal muscle strength/tone                Chest:  Improved breath sounds throughout lung fields.     No use of accessory muscles             Cardiac: Normal rate and regular rhythm, S1 and S2 normal; PMI non-displaced          Abdomen: tender and distended - improved from 1/10, no masses or organomegaly      Extremities: + pedal pulses palpable.     No pedal edema       LAB RESULTS:  Lab Results   Component Value Date    K 4.3 01/11/2018    K 4.0 01/10/2018    K 4.3 01/10/2018    CREATININE 1.6 (H) 01/11/2018    CREATININE 1.8 (H) 01/10/2018    CREATININE 1.7 (H) 01/10/2018     Lab Results   Component Value Date    WBC 10.63 01/11/2018    WBC 14.26 (H) 01/10/2018    WBC 10.40 01/09/2018    HCT 31.1 (L) 01/11/2018    HCT 35.0 (L) 01/10/2018    HCT 38.9 (L) 01/09/2018     (L) 01/11/2018     (L) 01/10/2018     01/09/2018     No results found for: HGBA1C  IMAGING:    PLAN:  57 y.o. male with a history of CAD s/p stenting x3 in 2014, ischemic cardiomyopathy, HLD, prediabetes, AAA and right iliac artery aneurysms s/p repair 1/8/18.    Neuro:  Post Operative Pain:  - dilaudid PCA 0.1 mg q6 minutes max of 1 mg/hr    Pulm:  Tobacco Abuse:  - nicotine  patch 14 mg/24hr  - duo-nebs scheduled q6 while awake  - counseling given - encouraged cessation    Right upper lobe atelectasis/ Pneumonia:  - CPT ordered  - incentive spirometry  - encourage sigh breaths/ intermittent deep breathing  - cefepime 1g q8  - daily CXR    Cardiac:  Hypertension:  - labetalol 10 mg IV q4 PRN for SBP greater than 155    CAD and Ischemic Cardiomyopathy:  - Goal MAP of >65  - maintain euvolemia    Renal:  Velez in place  Monitor and trend UOP (1.8L/0.7 ml/kg/hr)    Expected KRISTIAN:  Creatinine improved from 1.7 to 1.6   BUN 24 from 20  Will ensure adequate fluid resuscitation    FENGI:  GI prophylaxis:  - famotidine 20 mg BID    Fluids:  PO clear liquids    Electrolytes:  - replenish as needed  - daily CMP, mag, phos    Heme/Onc:  DVT prophylaxis: heparin 5000 units BID  Daily CBC, INR, PT, PTT    Infectious Disease:  -Cefazolin q8 for 2 doses postoperatively (completed)  - cefepime 1g q8 added for pneumonia    Disposition:  Plan for stepdown today    Rodrick Sanchez MD PGY2  Anesthesia/SICU

## 2018-01-11 NOTE — PT/OT/SLP EVAL
"Occupational Therapy   Evaluation and Discharge Note    Name: Georgi Jo  MRN: [id number]  Admitting Diagnosis:  AAA (abdominal aortic aneurysm) 3 Days Post-Op    Recommendations:     Discharge Recommendations: home (no OT Needs)  Discharge Equipment Recommendations:  none  Barriers to discharge:  None    History:     Occupational Profile:  Living Environment: lives with wife in [facility] with 4 CATIA and 1 HR  Previous level of function: (I)  Roles and Routines: retired; enjoys hunting and performs yard work; drives  Equipment Owned:  none  Assistance upon Discharge: wife can assist    Past Medical History:   Diagnosis Date    Hyperlipidemia     Myocardial infarction        Past Surgical History:   Procedure Laterality Date    CORONARY ANGIOPLASTY WITH STENT PLACEMENT  2007    X3    EYE SURGERY Right 2002       Subjective     Chief Complaint: "I may have pulled something."  Patient/Family stated goals: to go home  Communicated with: RN prior to session.  Pain/Comfort:  · Pain Rating 1: 0/10  · Pain Rating Post-Intervention 1: 0/10    Objective:     Patient found with: blood pressure cuff, telemetry, pulse ox (continuous) (IV heplock)    General Precautions: Standard,     Orthopedic Precautions:    Braces:       Occupational Performance:    Bed Mobility:    · Modified Independent with siderails    Functional Mobility/Transfers:  · Patient completed Sit <> Stand Transfer with modified independence  with  no assistive device   · Patient completed Bed <> Chair Transfer using Step Transfer technique with modified independence with no assistive device    Activities of Daily Living:  · Feeding:  independence    · Grooming: independence    · UB Dressing: independence    · LB Dressing: independence    · Toileting: independence      Cognitive/Visual Perceptual:  Oriented to: Person, Place, Time and Situation  Follows Commands/attention: Follows multistep  commands  Communication: clear/fluent  Memory:  No Deficits noted  Safety " "awareness/insight to disability: intact  Coping skills/emotional control: Appropriate to situation      Physical Exam:  Postural examination/scapula alignment:    -       No postural abnormalities identified  Sensation:    -       Intact  Upper Extremity Range of Motion:     -       Right Upper Extremity: WFL  -       Left Upper Extremity: WFL  Upper Extremity Strength:    -       Right Upper Extremity: WFL  -       Left Upper Extremity: WFL   Strength:    -       Right Upper Extremity: WFL  -       Left Upper Extremity: WFL  Fine Motor Coordination:    -       Intact  Gross motor coordination:   WFL      Patient left up in chair with all lines intact, call button in reach, rn notified and spouse present    [facility] 6 Click:  [facility] Total Score: 24    Treatment & Education:  Pt ed on OT POC and OT discharge  Pt performed self-care as above   Pt performed functional mobility around unit with SBA  Education:    Assessment:     Georgi Jo is a 57 y.o. male with a medical diagnosis of AAA (abdominal aortic aneurysm). At this time, patient is functioning at their prior level of function and does not require further acute OT services.     Clinical Decision Makin.  OT Low:  "Pt evaluation falls under low complexity for evaluation coding due to performance deficits noted in 1-3 areas as stated above and 0 co-morbities affecting current functional status. Data obtained from problem focused assessments. No modifications or assistance was required for completion of evaluation. Only brief occupational profile and history review completed."     Plan:     During this hospitalization, patient does not require further acute OT services.  Please re-consult if situation changes.    · Plan of Care Reviewed with: patient, spouse    This Plan of care has been discussed with the patient who was involved in its development and understands and is in agreement with the identified goals and treatment plan    GOALS:    Occupational " Therapy Goals     Not on file          Multidisciplinary Problems (Resolved)        Problem: Occupational Therapy Goal    Goal Priority Disciplines Outcome Interventions   Occupational Therapy Goal   (Resolved)     OT, PT/OT Outcome(s) achieved                    Time Tracking:     OT Date of Treatment: 01/11/18  OT Start Time: 1100  OT Stop Time: 1120  OT Total Time (min): 20 min    Billable Minutes:Evaluation 10  Therapeutic Activity 10    BENY Hidalgo  1/11/2018

## 2018-01-12 LAB
ALBUMIN SERPL BCP-MCNC: 2.3 G/DL
ALP SERPL-CCNC: 50 U/L
ALT SERPL W/O P-5'-P-CCNC: 9 U/L
ANION GAP SERPL CALC-SCNC: 10 MMOL/L
AST SERPL-CCNC: 21 U/L
BASOPHILS # BLD AUTO: 0.03 K/UL
BASOPHILS NFR BLD: 0.3 %
BILIRUB SERPL-MCNC: 0.9 MG/DL
BLD PROD TYP BPU: NORMAL
BLOOD UNIT EXPIRATION DATE: NORMAL
BLOOD UNIT TYPE CODE: 6200
BLOOD UNIT TYPE: NORMAL
BUN SERPL-MCNC: 22 MG/DL
CALCIUM SERPL-MCNC: 8.7 MG/DL
CHLORIDE SERPL-SCNC: 101 MMOL/L
CO2 SERPL-SCNC: 22 MMOL/L
CODING SYSTEM: NORMAL
CREAT SERPL-MCNC: 1.3 MG/DL
DIFFERENTIAL METHOD: ABNORMAL
DISPENSE STATUS: NORMAL
EOSINOPHIL # BLD AUTO: 0.2 K/UL
EOSINOPHIL NFR BLD: 1.7 %
ERYTHROCYTE [DISTWIDTH] IN BLOOD BY AUTOMATED COUNT: 13.3 %
EST. GFR  (AFRICAN AMERICAN): >60 ML/MIN/1.73 M^2
EST. GFR  (NON AFRICAN AMERICAN): >60 ML/MIN/1.73 M^2
GLUCOSE SERPL-MCNC: 105 MG/DL
HCT VFR BLD AUTO: 32.9 %
HGB BLD-MCNC: 10.8 G/DL
IMM GRANULOCYTES # BLD AUTO: 0.03 K/UL
IMM GRANULOCYTES NFR BLD AUTO: 0.3 %
LYMPHOCYTES # BLD AUTO: 1.3 K/UL
LYMPHOCYTES NFR BLD: 15.2 %
MAGNESIUM SERPL-MCNC: 2 MG/DL
MCH RBC QN AUTO: 29.2 PG
MCHC RBC AUTO-ENTMCNC: 32.8 G/DL
MCV RBC AUTO: 89 FL
MONOCYTES # BLD AUTO: 0.8 K/UL
MONOCYTES NFR BLD: 9.4 %
NEUTROPHILS # BLD AUTO: 6.3 K/UL
NEUTROPHILS NFR BLD: 73.1 %
NRBC BLD-RTO: 0 /100 WBC
NUM UNITS TRANS FFP: NORMAL
PHOSPHATE SERPL-MCNC: 3.1 MG/DL
PLATELET # BLD AUTO: 167 K/UL
PLATELET BLD QL SMEAR: ABNORMAL
PMV BLD AUTO: 10.4 FL
POTASSIUM SERPL-SCNC: 4.3 MMOL/L
PROT SERPL-MCNC: 6.3 G/DL
RBC # BLD AUTO: 3.7 M/UL
SODIUM SERPL-SCNC: 133 MMOL/L
TRANS ERYTHROCYTES VOL PATIENT: NORMAL ML
WBC # BLD AUTO: 8.62 K/UL

## 2018-01-12 PROCEDURE — 25000003 PHARM REV CODE 250: Performed by: SURGERY

## 2018-01-12 PROCEDURE — 94799 UNLISTED PULMONARY SVC/PX: CPT

## 2018-01-12 PROCEDURE — 94640 AIRWAY INHALATION TREATMENT: CPT

## 2018-01-12 PROCEDURE — 80053 COMPREHEN METABOLIC PANEL: CPT

## 2018-01-12 PROCEDURE — 94664 DEMO&/EVAL PT USE INHALER: CPT

## 2018-01-12 PROCEDURE — 25000242 PHARM REV CODE 250 ALT 637 W/ HCPCS: Performed by: GENERAL PRACTICE

## 2018-01-12 PROCEDURE — 63600175 PHARM REV CODE 636 W HCPCS: Performed by: SURGERY

## 2018-01-12 PROCEDURE — 84100 ASSAY OF PHOSPHORUS: CPT

## 2018-01-12 PROCEDURE — 97116 GAIT TRAINING THERAPY: CPT

## 2018-01-12 PROCEDURE — 20000000 HC ICU ROOM

## 2018-01-12 PROCEDURE — 25000003 PHARM REV CODE 250: Performed by: STUDENT IN AN ORGANIZED HEALTH CARE EDUCATION/TRAINING PROGRAM

## 2018-01-12 PROCEDURE — 63600175 PHARM REV CODE 636 W HCPCS: Performed by: STUDENT IN AN ORGANIZED HEALTH CARE EDUCATION/TRAINING PROGRAM

## 2018-01-12 PROCEDURE — 94761 N-INVAS EAR/PLS OXIMETRY MLT: CPT

## 2018-01-12 PROCEDURE — 25000242 PHARM REV CODE 250 ALT 637 W/ HCPCS: Performed by: SURGERY

## 2018-01-12 PROCEDURE — 85025 COMPLETE CBC W/AUTO DIFF WBC: CPT

## 2018-01-12 PROCEDURE — 99233 SBSQ HOSP IP/OBS HIGH 50: CPT | Mod: ,,, | Performed by: SURGERY

## 2018-01-12 PROCEDURE — 83735 ASSAY OF MAGNESIUM: CPT

## 2018-01-12 PROCEDURE — 99900035 HC TECH TIME PER 15 MIN (STAT)

## 2018-01-12 PROCEDURE — 27000221 HC OXYGEN, UP TO 24 HOURS

## 2018-01-12 RX ORDER — SODIUM CHLORIDE FOR INHALATION 3 %
4 VIAL, NEBULIZER (ML) INHALATION EVERY 6 HOURS PRN
Status: DISCONTINUED | OUTPATIENT
Start: 2018-01-12 | End: 2018-01-13 | Stop reason: HOSPADM

## 2018-01-12 RX ORDER — IPRATROPIUM BROMIDE AND ALBUTEROL SULFATE 2.5; .5 MG/3ML; MG/3ML
3 SOLUTION RESPIRATORY (INHALATION) EVERY 6 HOURS PRN
Status: DISCONTINUED | OUTPATIENT
Start: 2018-01-12 | End: 2018-01-13 | Stop reason: HOSPADM

## 2018-01-12 RX ORDER — FAMOTIDINE 20 MG/1
20 TABLET, FILM COATED ORAL 2 TIMES DAILY
Status: DISCONTINUED | OUTPATIENT
Start: 2018-01-12 | End: 2018-01-13 | Stop reason: HOSPADM

## 2018-01-12 RX ORDER — MAG HYDROX/ALUMINUM HYD/SIMETH 200-200-20
30 SUSPENSION, ORAL (FINAL DOSE FORM) ORAL
Status: DISCONTINUED | OUTPATIENT
Start: 2018-01-12 | End: 2018-01-13 | Stop reason: HOSPADM

## 2018-01-12 RX ORDER — CALCIUM CARBONATE 200(500)MG
500 TABLET,CHEWABLE ORAL DAILY PRN
Status: COMPLETED | OUTPATIENT
Start: 2018-01-12 | End: 2018-01-12

## 2018-01-12 RX ADMIN — SODIUM CHLORIDE SOLN NEBU 3% 4 ML: 3 NEBU SOLN at 02:01

## 2018-01-12 RX ADMIN — CEFEPIME 1 G: 1 INJECTION, POWDER, FOR SOLUTION INTRAMUSCULAR; INTRAVENOUS at 01:01

## 2018-01-12 RX ADMIN — CEFEPIME 1 G: 1 INJECTION, POWDER, FOR SOLUTION INTRAMUSCULAR; INTRAVENOUS at 08:01

## 2018-01-12 RX ADMIN — STANDARDIZED SENNA CONCENTRATE AND DOCUSATE SODIUM 1 TABLET: 8.6; 5 TABLET, FILM COATED ORAL at 08:01

## 2018-01-12 RX ADMIN — MUPIROCIN 1 G: 20 OINTMENT TOPICAL at 08:01

## 2018-01-12 RX ADMIN — CEFEPIME 1 G: 1 INJECTION, POWDER, FOR SOLUTION INTRAMUSCULAR; INTRAVENOUS at 05:01

## 2018-01-12 RX ADMIN — ALUMINUM HYDROXIDE, MAGNESIUM HYDROXIDE, AND SIMETHICONE 30 ML: 200; 200; 20 SUSPENSION ORAL at 11:01

## 2018-01-12 RX ADMIN — ASPIRIN 81 MG: 81 TABLET, COATED ORAL at 08:01

## 2018-01-12 RX ADMIN — SODIUM CHLORIDE SOLN NEBU 3% 4 ML: 3 NEBU SOLN at 08:01

## 2018-01-12 RX ADMIN — HEPARIN SODIUM 5000 UNITS: 5000 INJECTION, SOLUTION INTRAVENOUS; SUBCUTANEOUS at 08:01

## 2018-01-12 RX ADMIN — ATORVASTATIN CALCIUM 40 MG: 20 TABLET, FILM COATED ORAL at 08:01

## 2018-01-12 RX ADMIN — IPRATROPIUM BROMIDE AND ALBUTEROL SULFATE 3 ML: .5; 3 SOLUTION RESPIRATORY (INHALATION) at 02:01

## 2018-01-12 RX ADMIN — FAMOTIDINE 20 MG: 20 TABLET, FILM COATED ORAL at 08:01

## 2018-01-12 RX ADMIN — CALCIUM CARBONATE (ANTACID) CHEW TAB 500 MG 500 MG: 500 CHEW TAB at 01:01

## 2018-01-12 RX ADMIN — IPRATROPIUM BROMIDE AND ALBUTEROL SULFATE 3 ML: .5; 3 SOLUTION RESPIRATORY (INHALATION) at 08:01

## 2018-01-12 RX ADMIN — ACETAMINOPHEN 650 MG: 325 TABLET ORAL at 05:01

## 2018-01-12 RX ADMIN — ALUMINUM HYDROXIDE, MAGNESIUM HYDROXIDE, AND SIMETHICONE 30 ML: 200; 200; 20 SUSPENSION ORAL at 08:01

## 2018-01-12 RX ADMIN — ALUMINUM HYDROXIDE, MAGNESIUM HYDROXIDE, AND SIMETHICONE 30 ML: 200; 200; 20 SUSPENSION ORAL at 05:01

## 2018-01-12 RX ADMIN — ACETAMINOPHEN 650 MG: 325 TABLET ORAL at 11:01

## 2018-01-12 NOTE — PLAN OF CARE
"PRASHANT Turk notified to set up home health now that orders are written by DR. Rice notes, "awaiting stepdown to floor. Will need home health upon discharge.    "     01/12/18 1446   Right Care Assessment   Can the patient answer the patient profile reliably? Yes, cognitively intact   How often would a person be available to care for the patient? Whenever needed   Number of comorbid conditions (as recorded on the chart) Two   During the past month, has the patient often been bothered by feeling down, depressed or hopeless? No   Have you felt down, depressed, or hopeless? 0   During the past month, has the patient often been bothered by little interest or pleasure in doing things? No        01/12/18 1446   Right Care Assessment   Can the patient answer the patient profile reliably? Yes, cognitively intact   How often would a person be available to care for the patient? Whenever needed   Number of comorbid conditions (as recorded on the chart) Two   During the past month, has the patient often been bothered by feeling down, depressed or hopeless? No   Have you felt down, depressed, or hopeless? 0   During the past month, has the patient often been bothered by little interest or pleasure in doing things? No     "

## 2018-01-12 NOTE — ASSESSMENT & PLAN NOTE
58 yo male with hx of AAA s/p open AAA repair, POD4    Neuro:  - oxy PRN for pain.   - AAOx3    Cardio:  - Normotensive; prn antihypertensive medications  - home asa and statin     Resp:   - weaned to 2L NC.    - aggressive pulm toilet - IS, CPT, acapella   - CXR clear     FENGI:  - having bowel movements.   - famotidine   - add maalox for reflux. Keep HOB >30  - clear liquids  - KUB.   - suppositories daily      Renal:  - KVO IVF; KRISTIAN resolving. Cr 1.3   - voiding on own     Heme/ID:  - H/H stable No transfusion needed. Will continue to monitor   - no leukocytosis, afebrile  - on subq heparin    OOBTC, ambulate, PT/OT    DVT/ulcer ppx     Dispo: awaiting stepdown to floor. Will need home health upon discharge.

## 2018-01-12 NOTE — PLAN OF CARE
Problem: Physical Therapy Goal  Goal: Physical Therapy Goal  Goals to be met by: 18    Patient will increase functional independence with mobility by performin. Supine to sit with MInimal Assistance - met 18  2. Sit to stand transfer with Contact Guard Assistance - met 18  3. Gait  x 220 feet with Supervision using AD if needed.-  Met 18  4. Ascend/descend 4 stair with right Handrails Contact Guard Assistance - met 18     Outcome: Outcome(s) achieved Date Met: 18  Goals met and pt has been discharged from PT to ambulate in hallway with family and/or nursing. Erica Belcher, PT 2018

## 2018-01-12 NOTE — PLAN OF CARE
Referral sent to     [facility]  [facility] [phone]  [facility] [phone]  [facility] [phone]  [facility] [phone]  [phone]  [facility] [phone]  [facility] [phone]    Pricilla is f/u.

## 2018-01-12 NOTE — PLAN OF CARE
Additional referrals added    [facility]-[phone]  [facility]-[phone]    Pricilla is f/u.

## 2018-01-12 NOTE — PLAN OF CARE
Problem: Patient Care Overview  Goal: Individualization & Mutuality  PMH: CHF; EF 40%; CAD; current smoker    Dx: AAA repair     1/8/18: Extubated in OR; admitted to SICU;   1/9/18: OOB to chair         Nsg:   SBP <150  MAP > 60     Outcome: Ongoing (interventions implemented as appropriate)  Pt remains AAO x 4. NC 2L. O2 sat >95%. Bp stable. SR. Afebrile. C/o gas pain. Passing gas and bm, otherwise denies abd pain. Abd incision clean, dry, and intact. Staples intact. UO adequate. Transfer to lower level of care pending. Will continue to monitor.

## 2018-01-12 NOTE — PLAN OF CARE
Called [facility] & left message    Called [facility] & spoke to Kojo. Received referral & will F/U with Carmen

## 2018-01-12 NOTE — PROGRESS NOTES
[facility]  Vascular Surgery  Progress Note    Patient Name: Georgi Jo  MRN: [id number]  Admission Date: 1/8/2018  Primary Care Provider: Primary Doctor No    Subjective:     Interval History: VSS. Weaned to 2L NC. Having BMs but also distended and complaining of reflux. Otherwise NAEON.     Post-Op Info:  Procedure(s) (LRB):  Open Repair-Aneurysm-Abdominal-Aortic (Aaa)- Graph placement (N/A)   4 Days Post-Op       Medications:  Continuous Infusions:  Scheduled Meds:   acetaminophen  650 mg Oral Q6H    albuterol-ipratropium 2.5mg-0.5mg/3mL  3 mL Nebulization Q6H WAKE    aluminum-magnesium hydroxide-simethicone  30 mL Oral QID (AC & HS)    aspirin  81 mg Oral Daily    atorvastatin  40 mg Oral Daily    ceFEPime (MAXIPIME) IVPB  1 g Intravenous Q8H    famotidine  20 mg Oral BID    heparin (porcine)  5,000 Units Subcutaneous Q12H    mupirocin  1 g Nasal BID    senna-docusate 8.6-50 mg  1 tablet Oral BID    sodium chloride 3%  4 mL Nebulization Q6H WAKE     PRN Meds:labetalol, oxyCODONE     Objective:     Vital Signs (Most Recent):  Temp: 97.7 °F (36.5 °C) (01/12/18 0730)  Pulse: 79 (01/12/18 0842)  Resp: 20 (01/12/18 0842)  BP: (!) 147/75 (01/12/18 0730)  SpO2: 100 % (01/12/18 0842) Vital Signs (24h Range):  Temp:  [97.7 °F (36.5 °C)-98.3 °F (36.8 °C)] 97.7 °F (36.5 °C)  Pulse:  [61-93] 79  Resp:  [9-84] 20  SpO2:  [84 %-100 %] 100 %  BP: (124-165)/(75-85) 147/75       Date 01/12/18 0700 - 01/13/18 0659   Shift 5582-7213 0000-0879 3014-5723 24 Hour Total   I  N  T  A  K  E   Shift Total  (mL/kg)       O  U  T  P  U  T   Urine  (mL/kg/hr) 375   375    Shift Total  (mL/kg) 375  (3.4)   375  (3.4)   Weight (kg) 109.5 109.5 109.5 109.5       Physical Exam   Constitutional: He is oriented to person, place, and time. He appears well-developed and well-nourished.   Cardiovascular: Normal rate and regular rhythm.    Pulses:       Dorsalis pedis pulses are 2+ on the right side, and 2+ on the left  side.        Posterior tibial pulses are 2+ on the right side, and 2+ on the left side.   Pulmonary/Chest: Effort normal.   Abdominal: Soft. +Tympanic. He exhibits distension. There is tenderness. Midline incision with staples. C/d/i. Small tape burn.   Groins are soft bilaterally  Musculoskeletal: Normal range of motion. He exhibits no edema.   Neurological: He is alert and oriented to person, place, and time.   Skin: Skin is warm and dry.     Significant Labs:  CBC:   Recent Labs  Lab 01/12/18 0428   WBC 8.62   RBC 3.70*   HGB 10.8*   HCT 32.9*      MCV 89   MCH 29.2   MCHC 32.8     CMP:   Recent Labs  Lab 01/12/18 0428      CALCIUM 8.7   ALBUMIN 2.3*   PROT 6.3   *   K 4.3   CO2 22*      BUN 22*   CREATININE 1.3   ALKPHOS 50*   ALT 9*   AST 21   BILITOT 0.9       Significant Diagnostics:  I have reviewed all pertinent imaging results/findings within the past 24 hours.   CXR: much improved.   KUB: pending     Assessment/Plan:     * AAA (abdominal aortic aneurysm)    56 yo male with hx of AAA s/p open AAA repair, POD4    Neuro:  - oxy PRN for pain.   - AAOx3    Cardio:  - Normotensive; prn antihypertensive medications  - home asa and statin     Resp:   - weaned to 2L NC.    - aggressive pulm toilet - IS, CPT, acapella   - CXR clear     FENGI:  - having bowel movements.   - famotidine   - add maalox for reflux. Keep HOB >30  - clear liquids  - KUB.   - suppositories daily      Renal:  - KVO IVF; KRISTIAN resolving. Cr 1.3   - voiding on own     Heme/ID:  - H/H stable No transfusion needed. Will continue to monitor   - no leukocytosis, afebrile  - on subq heparin    OOBTC, ambulate, PT/OT    DVT/ulcer ppx     Dispo: awaiting stepdown to floor. Will need home health upon discharge.                 Jaime Juarez MD  Vascular Surgery  [facility]

## 2018-01-12 NOTE — PLAN OF CARE
Update 4:31 PM  SW spoke with pt wife.  Pt  Wife chose [facility] as the pt HH provider.  SW sent referral to [facility].     Update 3:59 PM  SW received a call from [facility].  SW informed that pt insurance will change to [employer] at the end f the month and this HH agency does not accept [employer] insurance.  SW contacted pt wife and discussed this matter with her.  Sw provided pt wife with HH list.  Pt wife will provide SW with another HH option. SW will send referral for HH services.       3:02pm  SW met with pt and pt wife to discuss d/c planning.  Sw informed pt about HH recommendation.  SW provided pt and pt wife with a list of available HH agencies. Pt chose [facility] as his HH provider.  SW sent HH referral to [facility].           Anand Nunes, LMSW [facility]  [id number]

## 2018-01-12 NOTE — PROGRESS NOTES
Georgi Jo  01/12/2018    HPI:  Patient is a 57 y.o. 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 He had supra-renal clamp in place for approximately 29 minutes. He was successfully extubated at the completion of the procedure.     Hospital Course:  1/9/18: interval improvement in pulmonary atelectasis. Patient not passing flatus or having BM    1/10/18: interval worsening of right upper lobe opacity, cefepime started, aggressive CPT, IS, hypertonic saline nebulizer    1/11/18: improving pulmonary function    Interval history  Patient endorses acid reflux overnight consistent with previous episodes. Requesting medication. The patient endorses improved dyspnea and cough.    Past Medical History:   Diagnosis Date    Hyperlipidemia     Myocardial infarction      Past Surgical History:   Procedure Laterality Date    CORONARY ANGIOPLASTY WITH STENT PLACEMENT  2007    X3    EYE SURGERY Right 2002     History reviewed. No pertinent family history.  Social History     Social History    Marital status:      Spouse name: N/A    Number of children: N/A    Years of education: N/A     Occupational History    Not on file.     Social History Main Topics    Smoking status: Current Every Day Smoker     Packs/day: 0.50     Years: 40.00    Smokeless tobacco: Never Used    Alcohol use Yes      Comment: rarely    Drug use: No    Sexual activity: Not on file     Other Topics Concern    Not on file     Social History Narrative    No narrative on file     No current facility-administered medications on file prior to encounter.      Current Outpatient Prescriptions on File Prior to Encounter   Medication Sig    atorvastatin (LIPITOR) 40 MG tablet Take 1 tablet (40 mg total)  by mouth once daily.    aspirin 81 MG Chew Take 1 tablet (81 mg total) by mouth once daily.       REVIEW OF SYSTEMS:  General: negative; ENT: negative; Allergy and Immunology: negative; Hematological and Lymphatic: Negative; Endocrine: negative; Respiratory: no cough, improved shortness of breath, no wheezing; Cardiovascular: no chest pain or dyspnea on exertion; Gastrointestinal: post operative abdominal pain, deneis bloody stools; Genito-Urinary: no dysuria, trouble voiding, or hematuria; Musculoskeletal: negative  Neurological: no TIA or stroke symptoms     PHYSICAL EXAM:      Pulse: 79  Temp: 97.7 °F (36.5 °C)      General appearance:  Alert, well-appearing, and in no distress.   Neurological: Normal speech, no focal findings noted; CN II - XII grossly intact           Musculoskeletal: Digits/nail without cyanosis/clubbing.  Normal muscle strength/tone                Chest:  Improved breath sounds throughout lung fields.     No use of accessory muscles             Cardiac: Normal rate and regular rhythm, S1 and S2 normal; PMI non-displaced          Abdomen: tender and distended - improved from 1/10, no masses or organomegaly      Extremities: + pedal pulses palpable.     No pedal edema       LAB RESULTS:  Lab Results   Component Value Date    K 4.3 01/12/2018    K 4.3 01/11/2018    K 4.0 01/10/2018    CREATININE 1.3 01/12/2018    CREATININE 1.6 (H) 01/11/2018    CREATININE 1.8 (H) 01/10/2018     Lab Results   Component Value Date    WBC 8.62 01/12/2018    WBC 10.63 01/11/2018    WBC 14.26 (H) 01/10/2018    HCT 32.9 (L) 01/12/2018    HCT 31.1 (L) 01/11/2018    HCT 35.0 (L) 01/10/2018     01/12/2018     (L) 01/11/2018     (L) 01/10/2018     No results found for: HGBA1C  IMAGING:    PLAN:  57 y.o. male with a history of CAD s/p stenting x3 in 2014, ischemic cardiomyopathy, HLD, prediabetes, AAA and right iliac artery aneurysms s/p repair 1/8/18.    Neuro:  Post Operative Pain:  Oxycodone 5 mg  po q6 PRN    Pulm:  Tobacco Abuse:  - nicotine patch 14 mg/24hr  - duo-nebs scheduled q6 while awake  - counseling given - encouraged cessation    Right upper lobe atelectasis/ Pneumonia:  - CPT ordered  - incentive spirometry  - encourage sigh breaths/ intermittent deep breathing  - cefepime 1g q8  - daily CXR    Cardiac:  Hypertension:  - labetalol 10 mg IV q4 PRN for SBP greater than 155    CAD and Ischemic Cardiomyopathy:  - Goal MAP of >65  - maintain euvolemia    Renal:  Velez in place  Monitor and trend UOP (1.8L/0.7 ml/kg/hr)    Expected KRISTIAN:  Creatinine improved from 1.7 to 1.6   BUN 24 from 20  Will ensure adequate fluid resuscitation    FENGI:  GI prophylaxis:  - famotidine 20 mg BID    Fluids:  Full diet    Electrolytes:  - replenish as needed  - daily CMP, mag, phos    Heme/Onc:  DVT prophylaxis: heparin 5000 units BID  Daily CBC, INR, PT, PTT    Infectious Disease:  -Cefazolin q8 for 2 doses postoperatively (completed)  - cefepime 1g q8 added for pneumonia    Disposition:  Plan for stepdown today pending bed on floor    Rodrick Sanchez MD PGY2  Anesthesia/SICU

## 2018-01-12 NOTE — PT/OT/SLP PROGRESS
Physical Therapy Treatment/Discharge    Patient Name:  Georgi Jo   MRN:  [id number]    Recommendations:     Discharge Recommendations:   (home no needs)   Discharge Equipment Recommendations: none   Barriers to discharge: None    Assessment:     Georgi Jo is a 57 y.o. male admitted with a medical diagnosis of AAA (abdominal aortic aneurysm).  He presents with the following impairments/functional limitations:   (no therapy issues) pt hayden treatment well and safe to discharge and ambulate on unit with family and/or nursing. Pt has no skilled PT needs. .    Rehab Prognosis:  good; patient would benefit from acute skilled PT services to address these deficits and reach maximum level of function.      Recent Surgery: Procedure(s) (LRB):  Open Repair-Aneurysm-Abdominal-Aortic (Aaa)- Graph placement (N/A) 4 Days Post-Op    Plan:     During this hospitalization, patient to be seen  (pt is being discharged from PT) to address the above listed problems via  (pt is being discharged from therapy.)  · Plan of Care Expires:  02/07/18   Plan of Care Reviewed with: patient, spouse    Subjective     Communicated with nurse prior to session.  Patient found supine upon PT entry to room, agreeable to treatment.      Chief Complaint: pt stated that he wanted to go home.   Patient comments/goals:  To go home   Pain/Comfort:  · Pain Rating 1: 0/10  · Pain Rating Post-Intervention 1: 0/10    Patients cultural, spiritual, Buddhism conflicts given the current situation: none    Objective:     Patient found with: telemetry (hep lock IV)     General Precautions: Standard, fall   Orthopedic Precautions:    Braces:       Functional Mobility:  · Bed Mobility:     · Rolling Right: independence  · Supine to Sit: independence  · Sit to Supine: independence  ·   · Transfers:     · Sit to Stand:  independence with no AD  ·   · Gait: 400 ft with supervision with RN with portable monitor  ·   · Stairs:  Pt ascended/descended 8 stair(s) with No  Assistive Device with left handrail with Supervision or Set-up Assistance.       AM-PAC 6 CLICK MOBILITY  Turning over in bed (including adjusting bedclothes, sheets and blankets)?: 4  Sitting down on and standing up from a chair with arms (e.g., wheelchair, bedside commode, etc.): 4  Moving from lying on back to sitting on the side of the bed?: 4  Moving to and from a bed to a chair (including a wheelchair)?: 4  Need to walk in hospital room?: 4  Climbing 3-5 steps with a railing?: 4  Total Score: 24         Patient left supine with all lines intact, call button in reach and wife. present..    GOALS:    Physical Therapy Goals     Not on file          Multidisciplinary Problems (Resolved)        Problem: Physical Therapy Goal    Goal Priority Disciplines Outcome Goal Variances Interventions   Physical Therapy Goal   (Resolved)     PT/OT, PT Outcome(s) achieved     Description:  Goals to be met by: 18    Patient will increase functional independence with mobility by performin. Supine to sit with MInimal Assistance - met 18  2. Sit to stand transfer with Contact Guard Assistance - met 18  3. Gait  x 220 feet with Supervision using AD if needed.-  Met 18  4. Ascend/descend 4 stair with right Handrails Contact Guard Assistance - met 18                       Time Tracking:     PT Received On: 18  PT Start Time: 1508     PT Stop Time: 1517  PT Total Time (min): 9 min     Billable Minutes: Gait Training 9 min    Treatment Type: Treatment  PT/PTA: PT     PTA Visit Number: 0     Erica Belcher, PT  2018

## 2018-01-12 NOTE — PLAN OF CARE
[facility]    HOME HEALTH ORDERS  FACE TO FACE ENCOUNTER    Patient Name: Georgi Jo  YOB: 1960    PCP: Primary Doctor No   PCP Address: None  PCP Phone Number: None  PCP Fax: None    Encounter Date: 01/12/2018    Admit to Home Health    Diagnoses:  Active Hospital Problems    Diagnosis  POA    *AAA (abdominal aortic aneurysm) [I71.4]  Yes    Chronic bronchitis [J42]  Yes    Systolic dysfunction [I51.9]  Yes    Coronary artery disease involving native coronary artery [I25.10]  Yes    COPD (chronic obstructive pulmonary disease) [J44.9]  Yes    Cigarette nicotine dependence without complication [F17.210]  Yes      Resolved Hospital Problems    Diagnosis Date Resolved POA   No resolved problems to display.       No future appointments.        I have seen and examined this patient face to face today. My clinical findings that support the need for the home health skilled services and home bound status are the following:  Medical restrictions requiring assistance of another human to leave home due to  Unstable ambulation and Wound care needs.    Allergies:  Review of patient's allergies indicates:   Allergen Reactions    Codeine        Diet: regular diet    Activities: activity as tolerated and no heavy lifting for 10 weeks    Nursing:   SN to complete comprehensive assessment including routine vital signs. Instruct on disease process and s/s of complications to report to MD. Review/verify medication list sent home with the patient at time of discharge  and instruct patient/caregiver as needed. Frequency may be adjusted depending on start of care date.    Notify MD if SBP > 160 or < 90; DBP > 90 or < 50; HR > 120 or < 50; Temp > 101; Other:       CONSULTS:    Physical Therapy to evaluate and treat. Evaluate for home safety and equipment needs; Establish/upgrade home exercise program. Perform / instruct on therapeutic exercises, gait training, transfer training, and Range of  Motion.  Aide to provide assistance with personal care, ADLs, and vital signs.    MISCELLANEOUS CARE:  N/A    WOUND CARE ORDERS  yes:  Surgical Wound:  Location: Midline Abdomen  Keep incision clean and dry. Does not need dressing unless drainage. Then can place dry gauze and change daily.       Medications: Review discharge medications with patient and family and provide education.      Current Discharge Medication List      CONTINUE these medications which have NOT CHANGED    Details   atorvastatin (LIPITOR) 40 MG tablet Take 1 tablet (40 mg total) by mouth once daily.  Qty: 90 tablet, Refills: 3      aspirin 81 MG Chew Take 1 tablet (81 mg total) by mouth once daily.  Refills: 0             I certify that this patient is confined to his home and needs intermittent skilled nursing care and physical therapy.

## 2018-01-12 NOTE — SUBJECTIVE & OBJECTIVE
Medications:  Continuous Infusions:  Scheduled Meds:   acetaminophen  650 mg Oral Q6H    albuterol-ipratropium 2.5mg-0.5mg/3mL  3 mL Nebulization Q6H WAKE    aluminum-magnesium hydroxide-simethicone  30 mL Oral QID (AC & HS)    aspirin  81 mg Oral Daily    atorvastatin  40 mg Oral Daily    ceFEPime (MAXIPIME) IVPB  1 g Intravenous Q8H    famotidine  20 mg Oral BID    heparin (porcine)  5,000 Units Subcutaneous Q12H    mupirocin  1 g Nasal BID    senna-docusate 8.6-50 mg  1 tablet Oral BID    sodium chloride 3%  4 mL Nebulization Q6H WAKE     PRN Meds:labetalol, oxyCODONE     Objective:     Vital Signs (Most Recent):  Temp: 97.7 °F (36.5 °C) (01/12/18 0730)  Pulse: 79 (01/12/18 0842)  Resp: 20 (01/12/18 0842)  BP: (!) 147/75 (01/12/18 0730)  SpO2: 100 % (01/12/18 0842) Vital Signs (24h Range):  Temp:  [97.7 °F (36.5 °C)-98.3 °F (36.8 °C)] 97.7 °F (36.5 °C)  Pulse:  [61-93] 79  Resp:  [9-84] 20  SpO2:  [84 %-100 %] 100 %  BP: (124-165)/(75-85) 147/75       Date 01/12/18 0700 - 01/13/18 0659   Shift 9856-7579 1930-2800 9604-9737 24 Hour Total   I  N  T  A  K  E   Shift Total  (mL/kg)       O  U  T  P  U  T   Urine  (mL/kg/hr) 375   375    Shift Total  (mL/kg) 375  (3.4)   375  (3.4)   Weight (kg) 109.5 109.5 109.5 109.5       Physical Exam   Constitutional: He is oriented to person, place, and time. He appears well-developed and well-nourished.   Cardiovascular: Normal rate and regular rhythm.    Pulses:       Dorsalis pedis pulses are 2+ on the right side, and 2+ on the left side.        Posterior tibial pulses are 2+ on the right side, and 2+ on the left side.   Pulmonary/Chest: Effort normal.   Abdominal: Soft. +Tympanic. He exhibits distension. There is tenderness. Midline incision with staples. C/d/i. Small tape burn.   Groins are soft bilaterally  Musculoskeletal: Normal range of motion. He exhibits no edema.   Neurological: He is alert and oriented to person, place, and time.   Skin: Skin is warm  and dry.     Significant Labs:  CBC:   Recent Labs  Lab 01/12/18 0428   WBC 8.62   RBC 3.70*   HGB 10.8*   HCT 32.9*      MCV 89   MCH 29.2   MCHC 32.8     CMP:   Recent Labs  Lab 01/12/18 0428      CALCIUM 8.7   ALBUMIN 2.3*   PROT 6.3   *   K 4.3   CO2 22*      BUN 22*   CREATININE 1.3   ALKPHOS 50*   ALT 9*   AST 21   BILITOT 0.9       Significant Diagnostics:  I have reviewed all pertinent imaging results/findings within the past 24 hours.   CXR: much improved.   KUB: pending

## 2018-01-13 VITALS
SYSTOLIC BLOOD PRESSURE: 153 MMHG | RESPIRATION RATE: 16 BRPM | DIASTOLIC BLOOD PRESSURE: 94 MMHG | OXYGEN SATURATION: 96 % | HEART RATE: 113 BPM | HEIGHT: 72 IN | WEIGHT: 241.38 LBS | TEMPERATURE: 97 F | BODY MASS INDEX: 32.69 KG/M2

## 2018-01-13 LAB
ALBUMIN SERPL BCP-MCNC: 2.5 G/DL
ALP SERPL-CCNC: 67 U/L
ALT SERPL W/O P-5'-P-CCNC: 17 U/L
ANION GAP SERPL CALC-SCNC: 10 MMOL/L
AST SERPL-CCNC: 37 U/L
BASOPHILS # BLD AUTO: 0.06 K/UL
BASOPHILS NFR BLD: 0.6 %
BILIRUB SERPL-MCNC: 0.8 MG/DL
BUN SERPL-MCNC: 21 MG/DL
CALCIUM SERPL-MCNC: 8.9 MG/DL
CHLORIDE SERPL-SCNC: 99 MMOL/L
CO2 SERPL-SCNC: 24 MMOL/L
CREAT SERPL-MCNC: 1.3 MG/DL
DIFFERENTIAL METHOD: ABNORMAL
EOSINOPHIL # BLD AUTO: 0.4 K/UL
EOSINOPHIL NFR BLD: 4.4 %
ERYTHROCYTE [DISTWIDTH] IN BLOOD BY AUTOMATED COUNT: 12.9 %
EST. GFR  (AFRICAN AMERICAN): >60 ML/MIN/1.73 M^2
EST. GFR  (NON AFRICAN AMERICAN): >60 ML/MIN/1.73 M^2
GLUCOSE SERPL-MCNC: 110 MG/DL
HCT VFR BLD AUTO: 37.2 %
HGB BLD-MCNC: 12.7 G/DL
IMM GRANULOCYTES # BLD AUTO: 0.09 K/UL
IMM GRANULOCYTES NFR BLD AUTO: 1 %
LYMPHOCYTES # BLD AUTO: 2.1 K/UL
LYMPHOCYTES NFR BLD: 22.7 %
MAGNESIUM SERPL-MCNC: 1.9 MG/DL
MCH RBC QN AUTO: 29.1 PG
MCHC RBC AUTO-ENTMCNC: 34.1 G/DL
MCV RBC AUTO: 85 FL
MONOCYTES # BLD AUTO: 1.1 K/UL
MONOCYTES NFR BLD: 11.6 %
NEUTROPHILS # BLD AUTO: 5.6 K/UL
NEUTROPHILS NFR BLD: 59.7 %
NRBC BLD-RTO: 0 /100 WBC
PHOSPHATE SERPL-MCNC: 2.2 MG/DL
PLATELET # BLD AUTO: 260 K/UL
PMV BLD AUTO: 9.7 FL
POTASSIUM SERPL-SCNC: 3.6 MMOL/L
PROT SERPL-MCNC: 6.7 G/DL
RBC # BLD AUTO: 4.37 M/UL
SODIUM SERPL-SCNC: 133 MMOL/L
WBC # BLD AUTO: 9.37 K/UL

## 2018-01-13 PROCEDURE — 25000003 PHARM REV CODE 250: Performed by: STUDENT IN AN ORGANIZED HEALTH CARE EDUCATION/TRAINING PROGRAM

## 2018-01-13 PROCEDURE — 36415 COLL VENOUS BLD VENIPUNCTURE: CPT

## 2018-01-13 PROCEDURE — 3E0234Z INTRODUCTION OF SERUM, TOXOID AND VACCINE INTO MUSCLE, PERCUTANEOUS APPROACH: ICD-10-PCS | Performed by: SURGERY

## 2018-01-13 PROCEDURE — 85025 COMPLETE CBC W/AUTO DIFF WBC: CPT

## 2018-01-13 PROCEDURE — 84100 ASSAY OF PHOSPHORUS: CPT

## 2018-01-13 PROCEDURE — 90670 PCV13 VACCINE IM: CPT | Performed by: SURGERY

## 2018-01-13 PROCEDURE — 63600175 PHARM REV CODE 636 W HCPCS: Performed by: SURGERY

## 2018-01-13 PROCEDURE — 80053 COMPREHEN METABOLIC PANEL: CPT

## 2018-01-13 PROCEDURE — G0009 ADMIN PNEUMOCOCCAL VACCINE: HCPCS | Performed by: SURGERY

## 2018-01-13 PROCEDURE — 83735 ASSAY OF MAGNESIUM: CPT

## 2018-01-13 PROCEDURE — 90471 IMMUNIZATION ADMIN: CPT | Performed by: SURGERY

## 2018-01-13 PROCEDURE — 63600175 PHARM REV CODE 636 W HCPCS: Performed by: STUDENT IN AN ORGANIZED HEALTH CARE EDUCATION/TRAINING PROGRAM

## 2018-01-13 RX ORDER — OXYCODONE HYDROCHLORIDE 5 MG/1
5 TABLET ORAL EVERY 6 HOURS PRN
Qty: 30 TABLET | Refills: 0 | Status: SHIPPED | OUTPATIENT
Start: 2018-01-13

## 2018-01-13 RX ORDER — SODIUM,POTASSIUM PHOSPHATES 280-250MG
2 POWDER IN PACKET (EA) ORAL ONCE
Status: COMPLETED | OUTPATIENT
Start: 2018-01-13 | End: 2018-01-13

## 2018-01-13 RX ADMIN — HEPARIN SODIUM 5000 UNITS: 5000 INJECTION, SOLUTION INTRAVENOUS; SUBCUTANEOUS at 08:01

## 2018-01-13 RX ADMIN — ALUMINUM HYDROXIDE, MAGNESIUM HYDROXIDE, AND SIMETHICONE 30 ML: 200; 200; 20 SUSPENSION ORAL at 06:01

## 2018-01-13 RX ADMIN — STANDARDIZED SENNA CONCENTRATE AND DOCUSATE SODIUM 1 TABLET: 8.6; 5 TABLET, FILM COATED ORAL at 08:01

## 2018-01-13 RX ADMIN — ACETAMINOPHEN 650 MG: 325 TABLET ORAL at 12:01

## 2018-01-13 RX ADMIN — FAMOTIDINE 20 MG: 20 TABLET, FILM COATED ORAL at 08:01

## 2018-01-13 RX ADMIN — PNEUMOCOCCAL 13-VALENT CONJUGATE VACCINE 0.5 ML: 2.2; 2.2; 2.2; 2.2; 2.2; 4.4; 2.2; 2.2; 2.2; 2.2; 2.2; 2.2; 2.2 INJECTION, SUSPENSION INTRAMUSCULAR at 12:01

## 2018-01-13 RX ADMIN — ATORVASTATIN CALCIUM 40 MG: 20 TABLET, FILM COATED ORAL at 08:01

## 2018-01-13 RX ADMIN — ASPIRIN 81 MG: 81 TABLET, COATED ORAL at 08:01

## 2018-01-13 RX ADMIN — ACETAMINOPHEN 650 MG: 325 TABLET ORAL at 11:01

## 2018-01-13 RX ADMIN — CEFEPIME HYDROCHLORIDE 1 G: 1 INJECTION, SOLUTION INTRAVENOUS at 11:01

## 2018-01-13 RX ADMIN — POTASSIUM & SODIUM PHOSPHATES POWDER PACK 280-160-250 MG 2 PACKET: 280-160-250 PACK at 11:01

## 2018-01-13 RX ADMIN — CEFEPIME HYDROCHLORIDE 1 G: 1 INJECTION, SOLUTION INTRAVENOUS at 02:01

## 2018-01-13 RX ADMIN — ACETAMINOPHEN 650 MG: 325 TABLET ORAL at 05:01

## 2018-01-13 NOTE — PROGRESS NOTES
[facility]  Vascular Surgery  Progress Note    Patient Name: Georgi Jo  MRN: [id number]  Admission Date: 1/8/2018  Primary Care Provider: Primary Doctor No    Subjective:     Interval History: VSS. Room air. Tolerating a regular diet, having bowel function. Reflux improved. Afebrile.     Post-Op Info:  Procedure(s) (LRB):  Open Repair-Aneurysm-Abdominal-Aortic (Aaa)- Graph placement (N/A)   5 Days Post-Op       Medications:  Continuous Infusions:  Scheduled Meds:   acetaminophen  650 mg Oral Q6H    aluminum-magnesium hydroxide-simethicone  30 mL Oral QID (AC & HS)    aspirin  81 mg Oral Daily    atorvastatin  40 mg Oral Daily    ceFEPime (MAXIPIME) IVPB  1 g Intravenous Q8H    famotidine  20 mg Oral BID    heparin (porcine)  5,000 Units Subcutaneous Q12H    mupirocin  1 g Nasal BID    senna-docusate 8.6-50 mg  1 tablet Oral BID     PRN Meds:albuterol-ipratropium 2.5mg-0.5mg/3mL, labetalol, oxyCODONE, pneumoc 13-marisol conj-dip cr(PF), sodium chloride 3%     Objective:     Vital Signs (Most Recent):  Temp: 97.6 °F (36.4 °C) (01/13/18 0842)  Pulse: 102 (01/13/18 0842)  Resp: 16 (01/13/18 0842)  BP: (!) 122/90 (01/13/18 0849)  SpO2: 95 % (01/13/18 0842) Vital Signs (24h Range):  Temp:  [97.6 °F (36.4 °C)-98.7 °F (37.1 °C)] 97.6 °F (36.4 °C)  Pulse:  [] 102  Resp:  [11-34] 16  SpO2:  [94 %-99 %] 95 %  BP: (122-169)/(68-90) 122/90          Physical Exam   Constitutional: He is oriented to person, place, and time. He appears well-developed and well-nourished.   Cardiovascular: Normal rate and regular rhythm.    Pulses:       Dorsalis pedis pulses are 2+ on the right side, and 2+ on the left side.        Posterior tibial pulses are 2+ on the right side, and 2+ on the left side.   Pulmonary/Chest: Effort normal.   Abdominal: Soft. He exhibits no distension. There is no tenderness.   Midline incision with staples - c/d/i   Musculoskeletal: Normal range of motion. He exhibits no edema.    Neurological: He is alert and oriented to person, place, and time.   Skin: Skin is warm and dry.       Significant Labs:  CBC:   Recent Labs  Lab 01/13/18  1001   WBC 9.37   RBC 4.37*   HGB 12.7*   HCT 37.2*      MCV 85   MCH 29.1   MCHC 34.1     CMP:   Recent Labs  Lab 01/12/18  0428      CALCIUM 8.7   ALBUMIN 2.3*   PROT 6.3   *   K 4.3   CO2 22*      BUN 22*   CREATININE 1.3   ALKPHOS 50*   ALT 9*   AST 21   BILITOT 0.9       Significant Diagnostics:  I have reviewed all pertinent imaging results/findings within the past 24 hours.    Assessment/Plan:     * AAA (abdominal aortic aneurysm)    58 yo male with hx of AAA s/p open AAA repair, POD5    - oxy PRN for pain.   - home asa and statin   - room air  - regular diet. maalox prn for reflux  - famotidine   - having bowel function   - voiding on own. Cr stable at 1.3  - h/h stable  - no leukocytosis, afebrile   - ambulating  - does not need home health per patient and family. I agree.     Dispo: discharge home today with 2 week follow up with Dr. Vanessa Juarez MD  Vascular Surgery  [facility]

## 2018-01-13 NOTE — NURSING TRANSFER
Nursing Transfer Note      1/13/2018     Transfer To: [address]    Transfer via wheelchair    Transfer with cardiac monitoring    Transported by 1 RN    Medicines sent: Yes    Chart send with patient: Yes    Notified:     Patient reassessed at: 0200 1/13/18     Upon arrival to floor: cardiac monitor applied, patient oriented to room, call bell in reach and bed in lowest position

## 2018-01-13 NOTE — PLAN OF CARE
Problem: Patient Care Overview  Goal: Plan of Care Review  Plan of care reviewed with patient; patient verbalized an understanding of the plan of care.  Patient is AAOx4; VSS. Patient independent. Patient is on a regular diet; patient denied nausea, emesis this shift.  Patient denied pain this shift.  Patient's ML is MENDOZA w/ staples; intact. Patient slept in between care. Patient remained free from falls, injury, and/or trauna.  Frequent rounding made for patient's safety. Call light within reach. [facility]

## 2018-01-13 NOTE — NURSING
Discharge instructions given to pt and pt's spouse, both verbalized understanding. PIVs removed. Pain med prescription given to pt. Pneumococcal vaccine administered to L deltoid. Educational sheets provided on vaccine. Pt leaving with spouse in wheelchair.

## 2018-01-13 NOTE — ASSESSMENT & PLAN NOTE
58 yo male with hx of AAA s/p open AAA repair, POD5    - oxy PRN for pain.   - home asa and statin   - room air  - regular diet. maalox prn for reflux  - famotidine   - having bowel function   - voiding on own. Cr wnl   - h/h stable  - no leukocytosis, afebrile   - ambulating  - does not need home health per patient and family. I agree.     Dispo: discharge home today with 2 week follow up with Dr. Mendez

## 2018-01-13 NOTE — PLAN OF CARE
Problem: Patient Care Overview  Goal: Plan of Care Review  Outcome: Ongoing (interventions implemented as appropriate)  Pt alert and oriented. Up to bathroom ad simon. Room air, blood pressure stable. Sinus rhythm on telemetry monitor. Pt may resume regular diet. Wife at bedside. Refer to flowsheet for vs and assessment.

## 2018-01-13 NOTE — SUBJECTIVE & OBJECTIVE
Medications:  Continuous Infusions:  Scheduled Meds:   acetaminophen  650 mg Oral Q6H    aluminum-magnesium hydroxide-simethicone  30 mL Oral QID (AC & HS)    aspirin  81 mg Oral Daily    atorvastatin  40 mg Oral Daily    ceFEPime (MAXIPIME) IVPB  1 g Intravenous Q8H    famotidine  20 mg Oral BID    heparin (porcine)  5,000 Units Subcutaneous Q12H    mupirocin  1 g Nasal BID    senna-docusate 8.6-50 mg  1 tablet Oral BID     PRN Meds:albuterol-ipratropium 2.5mg-0.5mg/3mL, labetalol, oxyCODONE, pneumoc 13-marisol conj-dip cr(PF), sodium chloride 3%     Objective:     Vital Signs (Most Recent):  Temp: 97.6 °F (36.4 °C) (01/13/18 0842)  Pulse: 102 (01/13/18 0842)  Resp: 16 (01/13/18 0842)  BP: (!) 122/90 (01/13/18 0849)  SpO2: 95 % (01/13/18 0842) Vital Signs (24h Range):  Temp:  [97.6 °F (36.4 °C)-98.7 °F (37.1 °C)] 97.6 °F (36.4 °C)  Pulse:  [] 102  Resp:  [11-34] 16  SpO2:  [94 %-99 %] 95 %  BP: (122-169)/(68-90) 122/90          Physical Exam   Constitutional: He is oriented to person, place, and time. He appears well-developed and well-nourished.   Cardiovascular: Normal rate and regular rhythm.    Pulses:       Dorsalis pedis pulses are 2+ on the right side, and 2+ on the left side.        Posterior tibial pulses are 2+ on the right side, and 2+ on the left side.   Pulmonary/Chest: Effort normal.   Abdominal: Soft. He exhibits no distension. There is no tenderness.   Midline incision with staples - c/d/i   Musculoskeletal: Normal range of motion. He exhibits no edema.   Neurological: He is alert and oriented to person, place, and time.   Skin: Skin is warm and dry.       Significant Labs:  CBC:   Recent Labs  Lab 01/13/18  1001   WBC 9.37   RBC 4.37*   HGB 12.7*   HCT 37.2*      MCV 85   MCH 29.1   MCHC 34.1     CMP:   Recent Labs  Lab 01/12/18  0428      CALCIUM 8.7   ALBUMIN 2.3*   PROT 6.3   *   K 4.3   CO2 22*      BUN 22*   CREATININE 1.3   ALKPHOS 50*   ALT 9*   AST 21    BILITOT 0.9       Significant Diagnostics:  I have reviewed all pertinent imaging results/findings within the past 24 hours.

## 2018-01-14 NOTE — DISCHARGE SUMMARY
[facility]  General Surgery  Discharge Summary      Patient Name: Georgi Jo  MRN: [id number]  Admission Date: 1/8/2018  Hospital Length of Stay: 5 days  Discharge Date and Time:  01/14/2018 2:14 PM  Attending Physician:CARMELITA Mendez   Discharging Provider: Jaime Juarez MD  Primary Care Provider: Primary Doctor No     HPI:  A 57-year-old male with a recently discovered 5.5   cm juxtarenal abdominal aortic aneurysm, who was sent for potential evaluation   of a fenestrated endograft.  He has no acute back or abdominal pain.     He now returns after cardiac eval in [place] which included a [facility] 12/12/17 showing EF 45%, non-obstructive coronary Dz.    Procedure(s) (LRB):  Open Repair-Aneurysm-Abdominal-Aortic (Aaa)- Graph placement (N/A)     Hospital Course: Underwent open AAA repair. Was transported to the ICU and kept there for a few days until his pulmonary toilet improved and respiratory status improved. His central line, carlos enrique, cruz were removed. His KRISTIAN improved daily and was voiding on his own. He was transferred to the floor and was tolerating a regular diet without nausea or vomiting. He was hemodynamically stable ambulating, pain controlled, having bowel function when he was discharged home on POD5.     Consults:     Significant Diagnostic Studies: Labs:   BMP:   Recent Labs  Lab 01/13/18  1001      *   K 3.6   CL 99   CO2 24   BUN 21*   CREATININE 1.3   CALCIUM 8.9   MG 1.9    and CBC   Recent Labs  Lab 01/13/18  1001   WBC 9.37   HGB 12.7*   HCT 37.2*          Pending Diagnostic Studies:     None        Final Active Diagnoses:    Diagnosis Date Noted POA    PRINCIPAL PROBLEM:  AAA (abdominal aortic aneurysm) [I71.4] 01/08/2018 Yes    Chronic bronchitis [J42] 01/11/2018 Yes    Systolic dysfunction [I51.9] 12/19/2017 Yes    Coronary artery disease involving native coronary artery [I25.10] 11/21/2017 Yes    COPD (chronic obstructive pulmonary disease)  [J44.9] 11/21/2017 Yes    Cigarette nicotine dependence without complication [F17.210] 11/21/2017 Yes      Problems Resolved During this Admission:    Diagnosis Date Noted Date Resolved POA      Discharged Condition: good    Disposition: Home or Self Care    Follow Up:  Follow-up Information     ADAM Mendez Iii, MD In 2 weeks.    Specialty:  Vascular Surgery  Why:  For wound re-check, For suture removal  Contact information:  [address]  [facility] [address]  [phone]                 Patient Instructions:     BASIC METABOLIC PANEL   Standing Status: Future  Standing Exp. Date: 03/14/19     Diet Adult Regular     Lifting restrictions   Order Comments: No heavy lifting greater than 10 lbs     Notify your health care provider if you experience any of the following:  increased confusion or weakness     Notify your health care provider if you experience any of the following:  persistent dizziness, light-headedness, or visual disturbances     Notify your health care provider if you experience any of the following:  worsening rash     Notify your health care provider if you experience any of the following:  severe persistent headache     Notify your health care provider if you experience any of the following:  difficulty breathing or increased cough     Notify your health care provider if you experience any of the following:  redness, tenderness, or signs of infection (pain, swelling, redness, odor or green/yellow discharge around incision site)     Notify your health care provider if you experience any of the following:  severe uncontrolled pain     Notify your health care provider if you experience any of the following:  persistent nausea and vomiting or diarrhea     Notify your health care provider if you experience any of the following:  temperature >100.4     No dressing needed       Medications:  Reconciled Home Medications:   Discharge Medication List as of 1/13/2018  1:10 PM      START taking  these medications    Details   oxyCODONE (ROXICODONE) 5 MG immediate release tablet Take 1 tablet (5 mg total) by mouth every 6 (six) hours as needed., Starting Sat 1/13/2018, Print         CONTINUE these medications which have NOT CHANGED    Details   atorvastatin (LIPITOR) 40 MG tablet Take 1 tablet (40 mg total) by mouth once daily., Starting Tue 11/21/2017, Until Wed 11/21/2018, Normal      aspirin 81 MG Chew Take 1 tablet (81 mg total) by mouth once daily., Starting Tue 11/21/2017, Until Wed 11/21/2018, OTC             Jaime Jaurez MD  General Surgery  [facility]

## 2018-01-15 LAB
BACTERIA BLD CULT: NORMAL
BACTERIA BLD CULT: NORMAL

## 2018-01-15 NOTE — PLAN OF CARE
No future appointments.  CM will send msg to  Clinic for below appt    Update 13:18 msg sent to [facility] c/o Mike to make 2 week appt from when pt dc on 1/13.  Follow Up:      Follow-up Information      ADAM Mendez Iii, MD In 2 weeks.    Specialty:  Vascular Surgery  Why:  For wound re-check, For suture removal  Contact information:  [address] [facility] [address]  [phone]     When they open at 0800     01/15/18 0717   Final Note   Assessment Type Final Discharge Note   Discharge Disposition Home-Health   Hospital Follow Up  Appt(s) scheduled? Yes   Discharge plans and expectations educations in teach back method with documentation complete? Yes   Right Care Referral Info   Post Acute Recommendation Home-care

## 2018-01-17 NOTE — PHYSICIAN QUERY
PT Name: Georgi Jo  MR #: [id number]     Physician Query Form - Documentation Clarification      CDS/: Olga Haynes RN  CCDS               Contact information: [email or web address]    This form is a permanent document in the medical record.     Query Date: January 17, 2018    By submitting this query, we are merely seeking further clarification of documentation. Please utilize your independent clinical judgment when addressing the question(s) below.    The Medical record reflects the following:    Supporting Clinical Findings Location in Medical Record    Right upper lobe atelectasis/ Pneumonia  - CPT ordered   - incentive spirometry   - encourage sigh breaths/ intermittent deep breathing   - cefepime 1g q8   - daily CXR    Interval development of focal abnormal opacification of the left lung base and at the right lung apex, consistent with volume loss. In addition, the opacification at the right lung apex is more extensive and other consolidation due to infection, aspiration, or hemorrhage cannot be completely excluded     Persistent low lung volumes with bilateral areas of consolidation     There is mild perihilar and lower lobe edema versus infiltrate. There is no change  CC Surgery progress note 1/10, 1/11, 1/12             CXR 1/8               CXR 1/10       CXR 1/12    Hospital Course:   Underwent open AAA repair. Was transported to the ICU and kept there for a few days until his pulmonary toilet improved and respiratory status improved.   Final Active Diagnoses:    PRINCIPAL PROBLEM: AAA (abdominal aortic aneurysm   COPD (chronic obstructive pulmonary disease)   Chronic bronchitis   Discharge Summary                                                                         Doctor, please specify diagnosis or diagnoses associated with above clinical findings.    For accurate coding and reporting, please clarify the respiratory condition diagnosis:    Provider Use Only        [  ]  atelectasis  and pneumonia      [x  ]  atelectasis only      [  ]  pneumonia only      [  ]  other respiratory condition (specify): ______________      [  ]  clinically undetermined

## 2018-01-26 ENCOUNTER — TELEPHONE (OUTPATIENT)
Dept: VASCULAR SURGERY | Facility: CLINIC | Age: 58
End: 2018-01-26

## 2018-01-26 ENCOUNTER — HOSPITAL ENCOUNTER (OUTPATIENT)
Facility: HOSPITAL | Age: 58
Discharge: HOME | End: 2018-01-27
Attending: EMERGENCY MEDICINE | Admitting: SURGERY
Payer: MEDICARE

## 2018-01-26 DIAGNOSIS — I71.40 AAA (ABDOMINAL AORTIC ANEURYSM): Primary | ICD-10-CM

## 2018-01-26 DIAGNOSIS — R10.9 ABDOMINAL PAIN: ICD-10-CM

## 2018-01-26 DIAGNOSIS — R10.84 GENERALIZED ABDOMINAL PAIN: ICD-10-CM

## 2018-01-26 LAB
ALBUMIN SERPL BCP-MCNC: 3.5 G/DL
ALP SERPL-CCNC: 70 U/L
ALT SERPL W/O P-5'-P-CCNC: 14 U/L
ANION GAP SERPL CALC-SCNC: 11 MMOL/L
AST SERPL-CCNC: 13 U/L
BACTERIA #/AREA URNS AUTO: ABNORMAL /HPF
BASOPHILS # BLD AUTO: 0.12 K/UL
BASOPHILS NFR BLD: 1.3 %
BILIRUB SERPL-MCNC: 0.4 MG/DL
BILIRUB UR QL STRIP: NEGATIVE
BUN SERPL-MCNC: 14 MG/DL
C DIFF GDH STL QL: NEGATIVE
C DIFF TOX A+B STL QL IA: NEGATIVE
CALCIUM SERPL-MCNC: 8.8 MG/DL
CHLORIDE SERPL-SCNC: 99 MMOL/L
CLARITY UR REFRACT.AUTO: CLEAR
CO2 SERPL-SCNC: 27 MMOL/L
COLOR UR AUTO: YELLOW
CREAT SERPL-MCNC: 1.2 MG/DL
DIFFERENTIAL METHOD: ABNORMAL
EOSINOPHIL # BLD AUTO: 0.4 K/UL
EOSINOPHIL NFR BLD: 4.2 %
ERYTHROCYTE [DISTWIDTH] IN BLOOD BY AUTOMATED COUNT: 13.2 %
EST. GFR  (AFRICAN AMERICAN): >60 ML/MIN/1.73 M^2
EST. GFR  (NON AFRICAN AMERICAN): >60 ML/MIN/1.73 M^2
GLUCOSE SERPL-MCNC: 150 MG/DL
GLUCOSE UR QL STRIP: NEGATIVE
HCT VFR BLD AUTO: 34.6 %
HGB BLD-MCNC: 11.8 G/DL
HGB UR QL STRIP: NEGATIVE
IMM GRANULOCYTES # BLD AUTO: 0.04 K/UL
IMM GRANULOCYTES NFR BLD AUTO: 0.4 %
KETONES UR QL STRIP: NEGATIVE
LACTATE SERPL-SCNC: 1.6 MMOL/L
LEUKOCYTE ESTERASE UR QL STRIP: ABNORMAL
LIPASE SERPL-CCNC: 25 U/L
LYMPHOCYTES # BLD AUTO: 3.1 K/UL
LYMPHOCYTES NFR BLD: 33.7 %
MCH RBC QN AUTO: 29.4 PG
MCHC RBC AUTO-ENTMCNC: 34.1 G/DL
MCV RBC AUTO: 86 FL
MICROSCOPIC COMMENT: ABNORMAL
MONOCYTES # BLD AUTO: 0.7 K/UL
MONOCYTES NFR BLD: 7.7 %
NEUTROPHILS # BLD AUTO: 4.9 K/UL
NEUTROPHILS NFR BLD: 52.7 %
NITRITE UR QL STRIP: NEGATIVE
NRBC BLD-RTO: 0 /100 WBC
OB PNL STL: NEGATIVE
PH UR STRIP: 7 [PH] (ref 5–8)
PLATELET # BLD AUTO: 481 K/UL
PMV BLD AUTO: 9.5 FL
POTASSIUM SERPL-SCNC: 3.8 MMOL/L
PROT SERPL-MCNC: 7.6 G/DL
PROT UR QL STRIP: NEGATIVE
RBC # BLD AUTO: 4.01 M/UL
RBC #/AREA URNS AUTO: 2 /HPF (ref 0–4)
SODIUM SERPL-SCNC: 137 MMOL/L
SP GR UR STRIP: 1.02 (ref 1–1.03)
SQUAMOUS #/AREA URNS AUTO: 0 /HPF
URN SPEC COLLECT METH UR: ABNORMAL
UROBILINOGEN UR STRIP-ACNC: 2 EU/DL
WBC # BLD AUTO: 9.24 K/UL
WBC #/AREA URNS AUTO: 31 /HPF (ref 0–5)

## 2018-01-26 PROCEDURE — 80053 COMPREHEN METABOLIC PANEL: CPT

## 2018-01-26 PROCEDURE — 99024 POSTOP FOLLOW-UP VISIT: CPT | Mod: ,,, | Performed by: SURGERY

## 2018-01-26 PROCEDURE — 25000003 PHARM REV CODE 250

## 2018-01-26 PROCEDURE — G0378 HOSPITAL OBSERVATION PER HR: HCPCS

## 2018-01-26 PROCEDURE — 99285 EMERGENCY DEPT VISIT HI MDM: CPT | Mod: 25

## 2018-01-26 PROCEDURE — 83605 ASSAY OF LACTIC ACID: CPT

## 2018-01-26 PROCEDURE — 96375 TX/PRO/DX INJ NEW DRUG ADDON: CPT

## 2018-01-26 PROCEDURE — 93010 ELECTROCARDIOGRAM REPORT: CPT | Mod: ,,, | Performed by: INTERNAL MEDICINE

## 2018-01-26 PROCEDURE — 96361 HYDRATE IV INFUSION ADD-ON: CPT

## 2018-01-26 PROCEDURE — 93005 ELECTROCARDIOGRAM TRACING: CPT

## 2018-01-26 PROCEDURE — 87449 NOS EACH ORGANISM AG IA: CPT

## 2018-01-26 PROCEDURE — 82272 OCCULT BLD FECES 1-3 TESTS: CPT

## 2018-01-26 PROCEDURE — 96374 THER/PROPH/DIAG INJ IV PUSH: CPT

## 2018-01-26 PROCEDURE — 25000003 PHARM REV CODE 250: Performed by: STUDENT IN AN ORGANIZED HEALTH CARE EDUCATION/TRAINING PROGRAM

## 2018-01-26 PROCEDURE — S0028 INJECTION, FAMOTIDINE, 20 MG: HCPCS

## 2018-01-26 PROCEDURE — 81001 URINALYSIS AUTO W/SCOPE: CPT

## 2018-01-26 PROCEDURE — 63600175 PHARM REV CODE 636 W HCPCS

## 2018-01-26 PROCEDURE — 83690 ASSAY OF LIPASE: CPT

## 2018-01-26 PROCEDURE — 85025 COMPLETE CBC W/AUTO DIFF WBC: CPT

## 2018-01-26 RX ORDER — OMEPRAZOLE 40 MG/1
40 CAPSULE, DELAYED RELEASE ORAL DAILY
COMMUNITY

## 2018-01-26 RX ORDER — OXYCODONE HYDROCHLORIDE 5 MG/1
10 TABLET ORAL EVERY 4 HOURS PRN
Status: DISCONTINUED | OUTPATIENT
Start: 2018-01-26 | End: 2018-01-27 | Stop reason: HOSPADM

## 2018-01-26 RX ORDER — DICYCLOMINE HYDROCHLORIDE 20 MG/1
20 TABLET ORAL EVERY 6 HOURS
COMMUNITY

## 2018-01-26 RX ORDER — TRAMADOL HYDROCHLORIDE 50 MG/1
50 TABLET ORAL EVERY 6 HOURS PRN
COMMUNITY

## 2018-01-26 RX ORDER — SUCRALFATE 1 G/10ML
1 SUSPENSION ORAL EVERY 6 HOURS
Status: DISCONTINUED | OUTPATIENT
Start: 2018-01-27 | End: 2018-01-27 | Stop reason: HOSPADM

## 2018-01-26 RX ORDER — NAPROXEN SODIUM 220 MG/1
81 TABLET, FILM COATED ORAL DAILY
Status: DISCONTINUED | OUTPATIENT
Start: 2018-01-27 | End: 2018-01-27 | Stop reason: HOSPADM

## 2018-01-26 RX ORDER — ONDANSETRON 4 MG/1
4 TABLET, ORALLY DISINTEGRATING ORAL EVERY 8 HOURS PRN
Status: DISCONTINUED | OUTPATIENT
Start: 2018-01-26 | End: 2018-01-27 | Stop reason: HOSPADM

## 2018-01-26 RX ORDER — DIPHENOXYLATE HYDROCHLORIDE AND ATROPINE SULFATE 2.5; .025 MG/1; MG/1
1 TABLET ORAL 4 TIMES DAILY PRN
Status: DISCONTINUED | OUTPATIENT
Start: 2018-01-26 | End: 2018-01-27 | Stop reason: HOSPADM

## 2018-01-26 RX ORDER — SODIUM CHLORIDE 0.9 % (FLUSH) 0.9 %
3 SYRINGE (ML) INJECTION
Status: DISCONTINUED | OUTPATIENT
Start: 2018-01-26 | End: 2018-01-27 | Stop reason: HOSPADM

## 2018-01-26 RX ORDER — ACETAMINOPHEN 325 MG/1
650 TABLET ORAL EVERY 8 HOURS PRN
Status: DISCONTINUED | OUTPATIENT
Start: 2018-01-26 | End: 2018-01-27 | Stop reason: HOSPADM

## 2018-01-26 RX ORDER — DIPHENHYDRAMINE HYDROCHLORIDE 50 MG/ML
25 INJECTION INTRAMUSCULAR; INTRAVENOUS EVERY 4 HOURS PRN
Status: DISCONTINUED | OUTPATIENT
Start: 2018-01-26 | End: 2018-01-27 | Stop reason: HOSPADM

## 2018-01-26 RX ORDER — ATORVASTATIN CALCIUM 20 MG/1
20 TABLET, FILM COATED ORAL DAILY
Status: DISCONTINUED | OUTPATIENT
Start: 2018-01-27 | End: 2018-01-27 | Stop reason: HOSPADM

## 2018-01-26 RX ORDER — FAMOTIDINE 10 MG/ML
20 INJECTION INTRAVENOUS
Status: COMPLETED | OUTPATIENT
Start: 2018-01-26 | End: 2018-01-26

## 2018-01-26 RX ORDER — OXYCODONE HYDROCHLORIDE 5 MG/1
5 TABLET ORAL EVERY 4 HOURS PRN
Status: DISCONTINUED | OUTPATIENT
Start: 2018-01-26 | End: 2018-01-27 | Stop reason: HOSPADM

## 2018-01-26 RX ORDER — DIPHENOXYLATE HYDROCHLORIDE AND ATROPINE SULFATE 2.5; .025 MG/1; MG/1
1 TABLET ORAL 4 TIMES DAILY PRN
COMMUNITY

## 2018-01-26 RX ORDER — ONDANSETRON 4 MG/1
4 TABLET, ORALLY DISINTEGRATING ORAL EVERY 8 HOURS PRN
COMMUNITY

## 2018-01-26 RX ORDER — PANTOPRAZOLE SODIUM 40 MG/1
40 TABLET, DELAYED RELEASE ORAL DAILY
Status: DISCONTINUED | OUTPATIENT
Start: 2018-01-27 | End: 2018-01-27 | Stop reason: HOSPADM

## 2018-01-26 RX ORDER — ONDANSETRON 2 MG/ML
4 INJECTION INTRAMUSCULAR; INTRAVENOUS
Status: COMPLETED | OUTPATIENT
Start: 2018-01-26 | End: 2018-01-26

## 2018-01-26 RX ORDER — ATORVASTATIN CALCIUM 20 MG/1
20 TABLET, FILM COATED ORAL DAILY
COMMUNITY

## 2018-01-26 RX ORDER — SODIUM CHLORIDE 9 MG/ML
INJECTION, SOLUTION INTRAVENOUS CONTINUOUS
Status: DISCONTINUED | OUTPATIENT
Start: 2018-01-26 | End: 2018-01-27 | Stop reason: HOSPADM

## 2018-01-26 RX ADMIN — FAMOTIDINE 20 MG: 10 INJECTION, SOLUTION INTRAVENOUS at 06:01

## 2018-01-26 RX ADMIN — ONDANSETRON 4 MG: 4 TABLET, ORALLY DISINTEGRATING ORAL at 10:01

## 2018-01-26 RX ADMIN — SODIUM CHLORIDE: 0.9 INJECTION, SOLUTION INTRAVENOUS at 10:01

## 2018-01-26 RX ADMIN — ALUMINUM HYDROXIDE, MAGNESIUM HYDROXIDE, AND SIMETHICONE 50 ML: 200; 200; 20 SUSPENSION ORAL at 05:01

## 2018-01-26 RX ADMIN — ONDANSETRON 4 MG: 2 INJECTION INTRAMUSCULAR; INTRAVENOUS at 06:01

## 2018-01-26 NOTE — TELEPHONE ENCOUNTER
----- Message from [place] sent at 1/26/2018 10:39 AM CST -----  Contact: Pt.'s Wife    Caller states she would like to speak with nurse in reference to were to bring  he is having stomach problems please call Pt.'s Wife  back @  [phone]   Thank You :)

## 2018-01-26 NOTE — ED TRIAGE NOTES
Presents to ER with generalized abdominal pain since his open heart surgery 1/8/2017.  States that everything he eats goes straight through him in diarrhea stool.    Pt identifiers checked and correct  LOC: The patient is awake, alert, aware of environment with an appropriate affect. Oriented x3, speaking appropriately  APPEARANCE: Pt resting comfortably, in no acute distress, pt is clean and well groomed, clothing properly fastened  SKIN: Skin warm, dry and intact, normal skin turgor, moist mucus membranes  RESPIRATORY: Airway is open and patent, respirations are spontaneous, even and unlabored, normal effort and rate  MUSCULOSKELETAL: No obvious deformities.

## 2018-01-26 NOTE — PROVIDER PROGRESS NOTES - EMERGENCY DEPT.
Encounter Date: 1/26/2018    ED Physician Progress Notes         EKG - STEMI Decision  Initial Reading: No STEMI present.  Response: No Action Needed.

## 2018-01-26 NOTE — ED PROVIDER NOTES
Encounter Date: 1/26/2018    SCRIBE #1 NOTE: I, Rebecca Rachel, am scribing for, and in the presence of,  Dr. Cortez . I have scribed the entire note.       History     Chief Complaint   Patient presents with    Abdominal Pain     reports had AAA surgery 1/8/18 at [facility] [place], has been having intermittent abdominal pain since surgery.      Time patient was seen by the provider: 5:18 PM      The patient is a 57 y.o. male with co-morbidities including: HLD and PM HX of MI, PS HX of AAA (abdominal aortic aneurysm) on 1/8/18 at [facility] [place], who presents to the ED with a complaint of severe abdominal pain that began 6 days after being discharged from AAA surgery. The patient reports pain is intermittent and describes as a burning pain mostly in the LLQ and epigastric abdominal area. Associated symptoms include diarrhea, black, tarry stools, and dark urine. Pt states he did not take any stool softeners or laxities to treat diarrhea. Pt had CT done 3 days ago and said everything looked fine.           The history is provided by the patient, the spouse and medical records.     Review of patient's allergies indicates:   Allergen Reactions    Codeine      Past Medical History:   Diagnosis Date    Hyperlipidemia     Myocardial infarction      Past Surgical History:   Procedure Laterality Date    CORONARY ANGIOPLASTY WITH STENT PLACEMENT  2007    X3    EYE SURGERY Right 2002     Family History   Problem Relation Age of Onset    Family history unknown: Yes     Social History   Substance Use Topics    Smoking status: Current Every Day Smoker     Packs/day: 0.50     Years: 40.00    Smokeless tobacco: Never Used    Alcohol use Yes      Comment: rarely     Review of Systems   Constitutional: Negative for fever.   HENT: Negative for sore throat.    Respiratory: Negative for shortness of breath.    Cardiovascular: Negative for chest pain.   Gastrointestinal: Positive for abdominal pain (LLQ and epigastric) and diarrhea. Negative for nausea.         (+) Black, tarry stools   Genitourinary: Negative for dysuria.        (+) Dark urine.    Musculoskeletal: Negative for back pain.   Skin: Negative for rash.   Neurological: Negative for weakness.   Hematological: Does not bruise/bleed easily.       Physical Exam     Initial Vitals [01/26/18 1552]   BP Pulse Resp Temp SpO2   131/74 96 18 98.4 °F (36.9 °C) 97 %      MAP       93         Physical Exam    Nursing note and vitals reviewed.  Constitutional: He appears well-developed and well-nourished.  Non-toxic appearance. He does not appear ill.   Pt appears in mild  pain distress   HENT:   Head: Normocephalic and atraumatic.   Nose: Nose normal.   Mouth/Throat: No oropharyngeal exudate.   Eyes: Conjunctivae and EOM are normal. Pupils are equal, round, and reactive to light.   Neck: Normal range of motion. Neck supple.   Cardiovascular: Normal rate and regular rhythm. Exam reveals no gallop, no distant heart sounds and no friction rub.    No murmur heard.  Pulmonary/Chest: Effort normal and breath sounds normal. No accessory muscle usage. No tachypnea. No respiratory distress. He has no decreased breath sounds. He has no wheezes. He has no rhonchi. He has no rales.   Abdominal: Normal appearance. There is tenderness. There is no rebound and no guarding.       Moderate TTP to epigastric area and LLQ.    Musculoskeletal: Normal range of motion. He exhibits no edema.   Neurological: He is alert and oriented to person, place, and time.   Skin: Skin is warm and dry. No rash noted. No pallor.   Midline ventral postsurgical wound is c/d/ staples intact.  No signs of wound infection   Psychiatric: He has a normal mood and affect. His behavior is normal. Judgment and thought content normal.         ED Course   Procedures  Labs Reviewed   CBC W/ AUTO DIFFERENTIAL - Abnormal; Notable for the following:        Result Value    RBC 4.01 (*)     Hemoglobin 11.8 (*)     Hematocrit 34.6 (*)     Platelets 481 (*)     All other  components within normal limits   COMPREHENSIVE METABOLIC PANEL - Abnormal; Notable for the following:     Glucose 150 (*)     All other components within normal limits   URINALYSIS - Abnormal; Notable for the following:     Leukocytes, UA 1+ (*)     All other components within normal limits   URINALYSIS MICROSCOPIC - Abnormal; Notable for the following:     WBC, UA 31 (*)     All other components within normal limits   CLOSTRIDIUM DIFFICILE   LIPASE   LACTIC ACID, PLASMA   OCCULT BLOOD X 1, STOOL   OCCULT BLOOD X 1, STOOL    Narrative:     Add on per Kerry Cortez MD     Imaging Results          CT Abdomen Pelvis With Contrast (Final result)  Result time 01/27/18 01:31:51    Final result by Tim Newton MD (01/27/18 01:31:51)                 Impression:        Postoperative changes from prior AAA open repair as above.  There is mild fat stranding around the aorta at the level of SMA and LORENZO takeoff, likely postoperative, with infectious process felt to be less likely. No drainable fluid collection. Recommend correlation with clinical and laboratory findings.      ______________________________________     Electronically signed by resident: ABRAM CARTER MD  Date:     01/27/18  Time:    01:20            As the supervising and teaching physician, I personally reviewed the images and resident's interpretation and I agree with the findings.          Electronically signed by: Tim Newton  Date:     01/27/18  Time:    01:31              Narrative:    Procedure comments: The patient was surveyed from the lung bases through the pelvis after the administration of 100 cc Omni 350 IV contrast as well as oral contrast and data was reconstructed for coronal, sagittal, and axial images.    Comparison: Abdominal radiograph 1/26/2018.    Indication: Abdominal pain.    Findings:    The lung bases are unremarkable.  There is no pleural fluid present.  The visualized portions of the heart appear normal.    The liver is normal  in size and attenuation with no focal hepatic abnormality.  No pleural venous patent.  The gallbladder shows no evidence of stones or pericholecystic fluid.  There is no intra-or extrahepatic biliary ductal dilatation.    The stomach, pancreas, and adrenal glands are unremarkable.  There are multiple granulomatous calcifications in the spleen.    The kidneys are normal in size and location and enhance appropriately.  There is no evidence of hydronephrosis.  There is no hydroureteronephrosis.   The urinary bladder and prostate demonstrate no significant abnormality.    The visualized loops of small and large bowel show no evidence of obstruction or inflammation.  There is no ascites, free fluid, or intraperitoneal free air. There is no evidence of lymph node enlargement in the abdomen or pelvis.    The osseous structures demonstrate no acute process.    Postoperative changes are noted from recent open abdominal aortic aneurysm repair.  Midline staple rows with mild subcutaneous stranding underneath the skin without focal fluid collections.  There is periaortic fat stranding at the level of SMA and LORENZO takeoff, likely reflecting postoperative change.   There is no drainable fluid collection.  The celiac trunk, SMA, bilateral renal arteries are patent.  There is a thrombosis aneurysmal sac just below the level of bifurcation measuring 4.5 cm in transverse dimension.  The iliac arteries are patent.                             X-Ray Abdomen Flat And Erect (Final result)  Result time 01/26/18 18:12:15    Final result by Fran Sun MD (01/26/18 18:12:15)                 Impression:      1.  Nonobstructive bowel gas pattern.        Electronically signed by: FRAN SUN MD  Date:     01/26/18  Time:    18:12              Narrative:    Abdomen flat and erect    Clinical history: Abdominal pain    Comparison: 1/12/2018    Findings:  1 upright view, 2 supine views.    Scattered air-fluid levels are noted, likely  within the large bowel, noting the bowel is nondilated.  Air and stool is seen within the large bowel, and projected over the rectum.  No focally dilated small bowel loops.  No large volume free air or pneumatosis.  No calcification to suggest nephrolithiasis or cholelithiasis.  Post surgical changes overlie the abdomen and pelvis.  No acute osseous abnormality.  The lower lung zones are grossly clear.                                      Medical Decision Making:   History:   Old Medical Records: I decided to obtain old medical records.  Independently Interpreted Test(s):   I have ordered and independently interpreted X-rays - see prior notes.  Clinical Tests:   Lab Tests: Ordered and Reviewed  Radiological Study: Ordered and Reviewed  Medical Tests: Ordered  ED Management:  This is a 56 yo woman s/p AA repair 01/08 here with epigastric pain and LLQ pain with assoc.bloating, flatus,  Dyspepsia and acid burning up to his neck.  Recently had workup with abd and CTof Abd/Pelvis at [facility] that showed no abnormalities assoc with the  Basic labs within normal limits except for borderline anemia.  FOBT negative. AXR was independently reviewed and interpreted by me and  shows nonspecific bowel gas pattern with moderate retained stool and gas in large bowel.  Case discussed with vascular surgery who saw the pt in ED and recommended a repeat scan.  Pain improved slightly with GI cocktail and Pepcid.  Will continue pain control and antiemetics prn while awaiting completion of study.  Other:   I have discussed this case with another health care provider.  Pt signed over to ED attending   This is a 56 yo man with recent AAA surgery here with abdominal pain case d/w Vascular Surgery.  Awaiting CT results.  Pt signed over to ED physician at shift change awaiting admission from vascular surgery.            Scribe Attestation:   Scribe #1: I performed the above scribed service and the documentation accurately describes the services  I performed. I attest to the accuracy of the note.    I, Dr. Kerry Cortez, personally performed the services described in this documentation. All medical record entries made by the scribe were at my direction and in my presence.  I have reviewed the chart and agree that the record reflects my personal performance and is accurate and complete. Kerry Cortez MD.  8:44 PM 02/13/2018            ED Course      Clinical Impression:   The primary encounter diagnosis was AAA (abdominal aortic aneurysm). Diagnoses of Abdominal pain and Generalized abdominal pain were also pertinent to this visit.                           Kerry Cortez MD  02/13/18 [id number]

## 2018-01-27 VITALS
WEIGHT: 225 LBS | TEMPERATURE: 98 F | RESPIRATION RATE: 16 BRPM | BODY MASS INDEX: 30.48 KG/M2 | HEIGHT: 72 IN | DIASTOLIC BLOOD PRESSURE: 79 MMHG | OXYGEN SATURATION: 98 % | HEART RATE: 95 BPM | SYSTOLIC BLOOD PRESSURE: 118 MMHG

## 2018-01-27 LAB
ANION GAP SERPL CALC-SCNC: 7 MMOL/L
BASOPHILS # BLD AUTO: 0.12 K/UL
BASOPHILS NFR BLD: 1.3 %
BUN SERPL-MCNC: 16 MG/DL
CALCIUM SERPL-MCNC: 9 MG/DL
CHLORIDE SERPL-SCNC: 99 MMOL/L
CO2 SERPL-SCNC: 30 MMOL/L
CREAT SERPL-MCNC: 1.3 MG/DL
DIFFERENTIAL METHOD: ABNORMAL
EOSINOPHIL # BLD AUTO: 0.3 K/UL
EOSINOPHIL NFR BLD: 3.6 %
ERYTHROCYTE [DISTWIDTH] IN BLOOD BY AUTOMATED COUNT: 13.2 %
EST. GFR  (AFRICAN AMERICAN): >60 ML/MIN/1.73 M^2
EST. GFR  (NON AFRICAN AMERICAN): >60 ML/MIN/1.73 M^2
GLUCOSE SERPL-MCNC: 96 MG/DL
HCT VFR BLD AUTO: 34.5 %
HGB BLD-MCNC: 11.5 G/DL
IMM GRANULOCYTES # BLD AUTO: 0.02 K/UL
IMM GRANULOCYTES NFR BLD AUTO: 0.2 %
LYMPHOCYTES # BLD AUTO: 2.8 K/UL
LYMPHOCYTES NFR BLD: 30.8 %
MAGNESIUM SERPL-MCNC: 1.8 MG/DL
MCH RBC QN AUTO: 29.4 PG
MCHC RBC AUTO-ENTMCNC: 33.3 G/DL
MCV RBC AUTO: 88 FL
MONOCYTES # BLD AUTO: 0.8 K/UL
MONOCYTES NFR BLD: 8.8 %
NEUTROPHILS # BLD AUTO: 5 K/UL
NEUTROPHILS NFR BLD: 55.3 %
NRBC BLD-RTO: 0 /100 WBC
PHOSPHATE SERPL-MCNC: 2.8 MG/DL
PLATELET # BLD AUTO: 425 K/UL
PMV BLD AUTO: 9.4 FL
POTASSIUM SERPL-SCNC: 4 MMOL/L
RBC # BLD AUTO: 3.91 M/UL
SODIUM SERPL-SCNC: 136 MMOL/L
WBC # BLD AUTO: 9 K/UL

## 2018-01-27 PROCEDURE — 25500020 PHARM REV CODE 255

## 2018-01-27 PROCEDURE — 80048 BASIC METABOLIC PNL TOTAL CA: CPT

## 2018-01-27 PROCEDURE — 25000003 PHARM REV CODE 250: Performed by: STUDENT IN AN ORGANIZED HEALTH CARE EDUCATION/TRAINING PROGRAM

## 2018-01-27 PROCEDURE — G0378 HOSPITAL OBSERVATION PER HR: HCPCS

## 2018-01-27 PROCEDURE — 84100 ASSAY OF PHOSPHORUS: CPT

## 2018-01-27 PROCEDURE — 85025 COMPLETE CBC W/AUTO DIFF WBC: CPT

## 2018-01-27 PROCEDURE — 83735 ASSAY OF MAGNESIUM: CPT

## 2018-01-27 RX ADMIN — PANTOPRAZOLE SODIUM 40 MG: 40 TABLET, DELAYED RELEASE ORAL at 08:01

## 2018-01-27 RX ADMIN — SODIUM CHLORIDE: 0.9 INJECTION, SOLUTION INTRAVENOUS at 09:01

## 2018-01-27 RX ADMIN — OXYCODONE HYDROCHLORIDE 5 MG: 5 TABLET ORAL at 08:01

## 2018-01-27 RX ADMIN — SUCRALFATE 1 G: 1 SUSPENSION ORAL at 12:01

## 2018-01-27 RX ADMIN — ASPIRIN 81 MG 81 MG: 81 TABLET ORAL at 08:01

## 2018-01-27 RX ADMIN — IOHEXOL 100 ML: 350 INJECTION, SOLUTION INTRAVENOUS at 12:01

## 2018-01-27 RX ADMIN — ATORVASTATIN CALCIUM 20 MG: 20 TABLET, FILM COATED ORAL at 08:01

## 2018-01-27 RX ADMIN — SUCRALFATE 1 G: 1 SUSPENSION ORAL at 07:01

## 2018-01-27 RX ADMIN — SUCRALFATE 1 G: 1 SUSPENSION ORAL at 01:01

## 2018-01-27 NOTE — SUBJECTIVE & OBJECTIVE
Medications:  Continuous Infusions:   sodium chloride 0.9% 125 mL/hr at 01/27/18 0937     Scheduled Meds:   aspirin  81 mg Oral Daily    atorvastatin  20 mg Oral Daily    pantoprazole  40 mg Oral Daily    sucralfate  1 g Oral Q6H     PRN Meds:acetaminophen, diphenhydrAMINE, diphenoxylate-atropine 2.5-0.025 mg, ondansetron, oxyCODONE, oxyCODONE, sodium chloride 0.9%     Objective:     Vital Signs (Most Recent):  Temp: 98.2 °F (36.8 °C) (01/27/18 1213)  Pulse: 97 (01/27/18 1213)  Resp: 16 (01/27/18 1213)  BP: 121/78 (01/27/18 1213)  SpO2: 95 % (01/27/18 1213) Vital Signs (24h Range):  Temp:  [97.6 °F (36.4 °C)-98.6 °F (37 °C)] 98.2 °F (36.8 °C)  Pulse:  [84-97] 97  Resp:  [16-18] 16  SpO2:  [95 %-99 %] 95 %  BP: (118-156)/(64-95) 121/78       Date 01/27/18 0700 - 01/28/18 0659   Shift 6744-1149 4619-3840 7215-8360 24 Hour Total   I  N  T  A  K  E   Shift Total  (mL/kg)       O  U  T  P  U  T   Urine  (mL/kg/hr) 700  (0.9)   700    Shift Total  (mL/kg) 700  (6.9)   700  (6.9)   Weight (kg) 102.1 102.1 102.1 102.1       Physical Exam   Constitutional: He is oriented to person, place, and time. He appears well-developed and well-nourished.   HENT:   Head: Normocephalic and atraumatic.   Eyes: EOM are normal. Pupils are equal, round, and reactive to light.   Neck: Normal range of motion. Neck supple.   Cardiovascular: Normal rate and regular rhythm.    Pulmonary/Chest: Effort normal and breath sounds normal.   Abdominal: Soft. Bowel sounds are normal. He exhibits no distension. There is no tenderness. There is no guarding.   Musculoskeletal: Normal range of motion.   Neurological: He is alert and oriented to person, place, and time.   Skin: Skin is warm.   Midline incision c/d/i   Nursing note and vitals reviewed.      Significant Labs:  BMP:   Recent Labs  Lab 01/27/18  0450   GLU 96      K 4.0   CL 99   CO2 30*   BUN 16   CREATININE 1.3   CALCIUM 9.0   MG 1.8     CBC:   Recent Labs  Lab 01/27/18  0450   WBC  9.00   RBC 3.91*   HGB 11.5*   HCT 34.5*   *   MCV 88   MCH 29.4   MCHC 33.3       Significant Diagnostics:  I have reviewed all pertinent imaging results/findings within the past 24 hours.     CT abdomen/pelvis w IV contrast - normal post-operative changes. No acute findings.

## 2018-01-27 NOTE — PLAN OF CARE
Problem: Patient Care Overview  Goal: Plan of Care Review  Outcome: Ongoing (interventions implemented as appropriate)  Pt with no falls or injuries this shift. Pt reports 1/10 pain level post pain med

## 2018-01-27 NOTE — ASSESSMENT & PLAN NOTE
57 M with hx of CAD and HLD and 5.5 cm juxtrarenal AAA s/p open AAA repair on 01/08.  Here for evaluation of abdominal discomfort and post prandial epigastric discomfort. Tolerating diet, having normal bowel function, vital signs normal, normal WBC and expected post operative changes on CT scan without any acute findings.     Staples removed at bedside.   D/c home today.     F/u with Dr. Mendez in 6 weeks.

## 2018-01-27 NOTE — PROGRESS NOTES
[facility]  Vascular Surgery  Progress Note    Patient Name: Georgi Jo  MRN: [id number]  Admission Date: 1/26/2018  Primary Care Provider: Primary Doctor No    Subjective:     Interval History: no acute events overnight.     Post-Op Info:  * No surgery found *           Medications:  Continuous Infusions:   sodium chloride 0.9% 125 mL/hr at 01/27/18 0937     Scheduled Meds:   aspirin  81 mg Oral Daily    atorvastatin  20 mg Oral Daily    pantoprazole  40 mg Oral Daily    sucralfate  1 g Oral Q6H     PRN Meds:acetaminophen, diphenhydrAMINE, diphenoxylate-atropine 2.5-0.025 mg, ondansetron, oxyCODONE, oxyCODONE, sodium chloride 0.9%     Objective:     Vital Signs (Most Recent):  Temp: 98.2 °F (36.8 °C) (01/27/18 1213)  Pulse: 97 (01/27/18 1213)  Resp: 16 (01/27/18 1213)  BP: 121/78 (01/27/18 1213)  SpO2: 95 % (01/27/18 1213) Vital Signs (24h Range):  Temp:  [97.6 °F (36.4 °C)-98.6 °F (37 °C)] 98.2 °F (36.8 °C)  Pulse:  [84-97] 97  Resp:  [16-18] 16  SpO2:  [95 %-99 %] 95 %  BP: (118-156)/(64-95) 121/78       Date 01/27/18 0700 - 01/28/18 0659   Shift 5677-5599 3345-2462 2767-3872 24 Hour Total   I  N  T  A  K  E   Shift Total  (mL/kg)       O  U  T  P  U  T   Urine  (mL/kg/hr) 700  (0.9)   700    Shift Total  (mL/kg) 700  (6.9)   700  (6.9)   Weight (kg) 102.1 102.1 102.1 102.1       Physical Exam   Constitutional: He is oriented to person, place, and time. He appears well-developed and well-nourished.   HENT:   Head: Normocephalic and atraumatic.   Eyes: EOM are normal. Pupils are equal, round, and reactive to light.   Neck: Normal range of motion. Neck supple.   Cardiovascular: Normal rate and regular rhythm.    Pulmonary/Chest: Effort normal and breath sounds normal.   Abdominal: Soft. Bowel sounds are normal. He exhibits no distension. There is no tenderness. There is no guarding.   Musculoskeletal: Normal range of motion.   Neurological: He is alert and oriented to person, place, and  time.   Skin: Skin is warm.   Midline incision c/d/i   Nursing note and vitals reviewed.      Significant Labs:  BMP:   Recent Labs  Lab 01/27/18  0450   GLU 96      K 4.0   CL 99   CO2 30*   BUN 16   CREATININE 1.3   CALCIUM 9.0   MG 1.8     CBC:   Recent Labs  Lab 01/27/18  0450   WBC 9.00   RBC 3.91*   HGB 11.5*   HCT 34.5*   *   MCV 88   MCH 29.4   MCHC 33.3       Significant Diagnostics:  I have reviewed all pertinent imaging results/findings within the past 24 hours.     CT abdomen/pelvis w IV contrast - normal post-operative changes. No acute findings.     Assessment/Plan:     AAA (abdominal aortic aneurysm)      57 M with hx of CAD and HLD and 5.5 cm juxtrarenal AAA s/p open AAA repair on 01/08.  Here for evaluation of abdominal discomfort and post prandial epigastric discomfort. Tolerating diet, having normal bowel function, vital signs normal, normal WBC and expected post operative changes on CT scan without any acute findings.     Staples removed at bedside.   D/c home today.     F/u with Dr. Mendez in 6 weeks.             Oneal Guadalupe MD  Vascular Surgery  [facility]

## 2018-01-27 NOTE — PROGRESS NOTES
Transferred from ER to OBS- via wheelchair by escort with spouse at the bedside o. Patient transferred from stretcher to bed independently.  Patient id verified, fall and allergy band in place. Patient AAOx4. Patient oriented to room and call bell system. Patient able to voice use of call bell system. Patient call bell placed within reach of patient. Assessed patient and vitals, respirations even and unlabored. Patient assessed for pain using pain scale located in patient room. Monitor patient for facial grimacing and or any guarding. Will provide patient with PRN pain medication as needed. HOB elevated to 45 degrees for lung expansion. Bed locked and in lowest possible position, condition stable, will continue to monitor patient.

## 2018-01-27 NOTE — NURSING
Order to d/c home today. Saline lock removed. VSS. Discharge instructions given to pt and spouse. Pt verbalized understanding. Pt discharged from OBS unit ambulating. Pt accompanied by spouse. All personal belongings taken home by pt.

## 2018-01-27 NOTE — CONSULTS
H&P  Vascular Surgery    CC:   Chief Complaint   Patient presents with    Abdominal Pain     reports had AAA surgery 1/8/18 [place] [facility] [place], has been having intermittent abdominal pain since surgery.    Vascular Surgery Staff  I have reviewed the patients history, physical exam, pertinent labs and imaging. I agree with the management plan as outlined in the resident note.  No peritoneal findings and low likely infante ischemic colitis.  Check CT A/P  Observation.    Felecia Angel MD [facility]  Vascular & Endovascular Surgery      HPI: 58 yo male with hx of CAD and HLD and 5.5 cm juxtrarenal AAA s/p open AAA repair on 01/08. His course was uncomplicated and he was discharged from the hospital on 01/14/18. He states that he began to have abdominal pain as soon as he got home located in the LLQ and epigastrum. Associated symptoms include reflux, tarry diarrhea, dark urine, nausea. Denies any sick contacts or vomiting or BRBPR. Pepcid, rolaids have not relieved the pain. Received GI cocktail that has also not relieved the pain. The pain comes on immediately upon eating. States he has lost weight since surgery due to this. Endorses subjective fever and night sweats.     PMH:   Past Medical History:   Diagnosis Date    Hyperlipidemia     Myocardial infarction        PSH:   Past Surgical History:   Procedure Laterality Date    CORONARY ANGIOPLASTY WITH STENT PLACEMENT  2007    X3    EYE SURGERY Right 2002       Allergies:   Review of patient's allergies indicates:   Allergen Reactions    Codeine      Meds:   Patient's Medications   New Prescriptions    No medications on file   Previous Medications    ASPIRIN 81 MG CHEW    Take 1 tablet (81 mg total) by mouth once daily.    ATORVASTATIN (LIPITOR) 20 MG TABLET    Take 20 mg by mouth once daily.    ATORVASTATIN (LIPITOR) 40 MG TABLET    Take 1 tablet (40 mg total) by mouth once daily.    DICYCLOMINE (BENTYL) 20 MG TABLET    Take 20 mg by mouth every 6 (six) hours.     DIPHENOXYLATE-ATROPINE 2.5-0.025 MG (LOMOTIL) 2.5-0.025 MG PER TABLET    Take 1 tablet by mouth 4 (four) times daily as needed for Diarrhea.    OMEPRAZOLE (PRILOSEC) 40 MG CAPSULE    Take 40 mg by mouth once daily.    ONDANSETRON (ZOFRAN-ODT) 4 MG TBDL    Take 4 mg by mouth every 8 (eight) hours as needed.    OXYCODONE (ROXICODONE) 5 MG IMMEDIATE RELEASE TABLET    Take 1 tablet (5 mg total) by mouth every 6 (six) hours as needed.    TRAMADOL (ULTRAM) 50 MG TABLET    Take 50 mg by mouth every 6 (six) hours as needed for Pain.   Modified Medications    No medications on file   Discontinued Medications    No medications on file       Family History   Problem Relation Age of Onset    Family history unknown: Yes       Review of Systems - For pertinent positives please see HPI   Constitutional: Negative for fever and chills.   HENT: Negative for congestion and ear pain.   Respiratory: Negative for cough and shortness of breath.   Cardiovascular: Negative for chest pain and palpitations.   Gastrointestinal: See HPI   Genitourinary: Negative for dysuria and hematuria.   Musculoskeletal: Negative for myalgias and arthralgias.   Skin: Negative for rash and wound.   Neurological: Negative for weakness and numbness.   Psychiatric/Behavioral: Negative for confusion and dysphoric mood    OBJECTIVE:     Vital Signs (Most Recent)  Temp: 98.6 °F (37 °C) (01/26/18 1731)  Pulse: 92 (01/26/18 1731)  Resp: 18 (01/26/18 1731)  BP: 138/83 (01/26/18 1731)  SpO2: 97 % (01/26/18 1731)    Vital Signs Range (Last 24H):  Temp:  [98.4 °F (36.9 °C)-98.6 °F (37 °C)]   Pulse:  [92-96]   Resp:  [18]   BP: (131-138)/(74-83)   SpO2:  [97 %]     I & O (Last 24H):No intake or output data in the 24 hours ending 01/26/18 1943    Physical Exam:  General: well developed, well nourished, no distress   Head: NC / AT  EOMI / no scleral icterus   Neck: supple, no tracheal deviation  Lungs:  clear to auscultation bilaterally and normal respiratory  effort  Heart: regular rate and rhythm, S1, S2 normal, no murmur, rub or gallop  Abdomen: Midline incision with staples in place. Soft, tender to palpation in LLQ and epigastrium.   Neuro: A&O x3, no focal deficit  Ext: wwp, no edema. 2+ DP pulses bilaterally.       Laboratory:  CBC:   Recent Labs  Lab 01/26/18  1733   WBC 9.24   RBC 4.01*   HGB 11.8*   HCT 34.6*   *   MCV 86   MCH 29.4   MCHC 34.1     CMP:   Recent Labs  Lab 01/26/18  1733   *   CALCIUM 8.8   ALBUMIN 3.5   PROT 7.6      K 3.8   CO2 27   CL 99   BUN 14   CREATININE 1.2   ALKPHOS 70   ALT 14   AST 13   BILITOT 0.4     Lactate 1.6  UA - 31 WBC, 2 RBC, 1+  Negative occult blood  C. Diff Pending     Coagulation: No results for input(s): PT, INR, APTT in the last 168 hours.  Labs within the past 24 hours have been reviewed.    KUB:  Scattered air-fluid levels are noted, likely within the large bowel, noting the bowel is nondilated. Air and stool is seen within the large bowel, and projected over the rectum. No focally dilated small bowel loops. No large volume free air or   pneumatosis. No calcification to suggest nephrolithiasis or cholelithiasis. Post surgical changes overlie the abdomen and pelvis. No acute osseous abnormality. The lower lung zones are grossly clear.     ASSESSMENT/PLAN:     56 yo male with hx of 5.5 juxtrarenal AAA s/p open repair on 01/08/18 presents with abdominal pain, nausea and vomiting    - Admit to Dr. Angel   - No leukocytosis, vss, lactate within normal limits  - Cr baseline   - occult blood negative   - will admit to observation- fluids, BID protonix,   - prn IV pain medications   - restart home meds     Jaime Juarez, PGY-3  General Surgery  [phone]

## 2018-01-29 NOTE — DISCHARGE SUMMARY
[facility]  General Surgery  Discharge Summary      Patient Name: Georgi Jo  MRN: [id number]  Admission Date: 1/26/2018  Hospital Length of Stay: 0 days  Discharge Date and Time:  01/29/2018 9:25 AM  Attending Physician: Felecia Angel MD  Discharging Provider: Jaime Juarez MD  Primary Care Provider: Primary Doctor No     HPI: 58 yo male with hx of CAD and HLD and 5.5 cm juxtrarenal AAA s/p open AAA repair on 01/08. His course was uncomplicated and he was discharged from the hospital on 01/14/18. He states that he began to have abdominal pain as soon as he got home located in the LLQ and epigastrum. Associated symptoms include reflux, tarry diarrhea, dark urine, nausea. Denies any sick contacts or vomiting or BRBPR. Pepcid, rolaids have not relieved the pain. Received GI cocktail that has also not relieved the pain. The pain comes on immediately upon eating. States he has lost weight since surgery due to this. Endorses subjective fever and night sweats.     * No surgery found *     Hospital Course:  Admitted for observation, given IV fluids and CT A/P with IV and PO contrast was performed which did not show any intraabdominal fluid collection, or signs of ischemic colitis. He was initiated on a diet and felt improved the next morning and wanted to discharge home. He was hemodynamically stable and reflux was baseline. His staples were removed and patient was discharged home without any needs.     Consults: None    Significant Diagnostic Studies:   Lab Results   Component Value Date    WBC 9.00 01/27/2018    HGB 11.5 (L) 01/27/2018    HCT 34.5 (L) 01/27/2018    MCV 88 01/27/2018     (H) 01/27/2018     BMP  Lab Results   Component Value Date     01/27/2018    K 4.0 01/27/2018    CL 99 01/27/2018    CO2 30 (H) 01/27/2018    BUN 16 01/27/2018    CREATININE 1.3 01/27/2018    CALCIUM 9.0 01/27/2018    ANIONGAP 7 (L) 01/27/2018    ESTGFRAFRICA >60.0 01/27/2018    EGFRNONAA >60.0  01/27/2018         Pending Diagnostic Studies:     None        Final Active Diagnoses:    Diagnosis Date Noted POA    PRINCIPAL PROBLEM:  AAA (abdominal aortic aneurysm) [I71.4] 01/08/2018 Yes    Abdominal pain [R10.9]  Unknown      Problems Resolved During this Admission:    Diagnosis Date Noted Date Resolved POA      Discharged Condition: good    Disposition: Home or Self Care    Follow Up:  Follow-up Information     ADAM Mendez Iii, MD In 6 weeks.    Specialty:  Vascular Surgery  Contact information:  Teresa ZHANG  [facility] [id number] [phone]                 Patient Instructions:   Call MD if vomiting, or uncontrolled abdominal pain     Medications:  Reconciled Home Medications:   Discharge Medication List as of 1/27/2018  4:35 PM      CONTINUE these medications which have NOT CHANGED    Details   aspirin 81 MG Chew Take 1 tablet (81 mg total) by mouth once daily., Starting Tue 11/21/2017, Until Wed 11/21/2018, OTC      !! atorvastatin (LIPITOR) 20 MG tablet Take 20 mg by mouth once daily., Historical Med      !! atorvastatin (LIPITOR) 40 MG tablet Take 1 tablet (40 mg total) by mouth once daily., Starting Tue 11/21/2017, Until Wed 11/21/2018, Normal      dicyclomine (BENTYL) 20 mg tablet Take 20 mg by mouth every 6 (six) hours., Historical Med      diphenoxylate-atropine 2.5-0.025 mg (LOMOTIL) 2.5-0.025 mg per tablet Take 1 tablet by mouth 4 (four) times daily as needed for Diarrhea., Historical Med      omeprazole (PRILOSEC) 40 MG capsule Take 40 mg by mouth once daily., Historical Med      ondansetron (ZOFRAN-ODT) 4 MG TbDL Take 4 mg by mouth every 8 (eight) hours as needed., Historical Med      oxyCODONE (ROXICODONE) 5 MG immediate release tablet Take 1 tablet (5 mg total) by mouth every 6 (six) hours as needed., Starting Sat 1/13/2018, Print      traMADol (ULTRAM) 50 mg tablet Take 50 mg by mouth every 6 (six) hours as needed for Pain., Historical Med       !! - Potential duplicate  medications found. Please discuss with provider.      Regular diet   Follow up in 1 month     Jaime Juarez MD  General Surgery  [facility]
